# Patient Record
Sex: FEMALE | Race: WHITE | Employment: FULL TIME | URBAN - METROPOLITAN AREA
[De-identification: names, ages, dates, MRNs, and addresses within clinical notes are randomized per-mention and may not be internally consistent; named-entity substitution may affect disease eponyms.]

---

## 2017-01-12 ENCOUNTER — OFFICE VISIT (OUTPATIENT)
Dept: FAMILY MEDICINE CLINIC | Age: 52
End: 2017-01-12

## 2017-01-12 VITALS
DIASTOLIC BLOOD PRESSURE: 80 MMHG | BODY MASS INDEX: 22.97 KG/M2 | HEART RATE: 56 BPM | WEIGHT: 124.8 LBS | SYSTOLIC BLOOD PRESSURE: 118 MMHG | RESPIRATION RATE: 12 BRPM | TEMPERATURE: 97.7 F | HEIGHT: 62 IN

## 2017-01-12 DIAGNOSIS — D50.9 IRON DEFICIENCY ANEMIA, UNSPECIFIED IRON DEFICIENCY ANEMIA TYPE: ICD-10-CM

## 2017-01-12 DIAGNOSIS — R42 DIZZINESS: ICD-10-CM

## 2017-01-12 DIAGNOSIS — F41.9 ANXIETY: Primary | ICD-10-CM

## 2017-01-12 LAB
A/G RATIO: 1.5 (ref 1.1–2.2)
ALBUMIN SERPL-MCNC: 4.3 G/DL (ref 3.4–5)
ALP BLD-CCNC: 41 U/L (ref 40–129)
ALT SERPL-CCNC: 17 U/L (ref 10–40)
ANION GAP SERPL CALCULATED.3IONS-SCNC: 15 MMOL/L (ref 3–16)
AST SERPL-CCNC: 16 U/L (ref 15–37)
BASOPHILS ABSOLUTE: 0 K/UL (ref 0–0.2)
BASOPHILS RELATIVE PERCENT: 0.8 %
BILIRUB SERPL-MCNC: 0.4 MG/DL (ref 0–1)
BUN BLDV-MCNC: 17 MG/DL (ref 7–20)
CALCIUM SERPL-MCNC: 9.3 MG/DL (ref 8.3–10.6)
CHLORIDE BLD-SCNC: 101 MMOL/L (ref 99–110)
CO2: 24 MMOL/L (ref 21–32)
CREAT SERPL-MCNC: 0.7 MG/DL (ref 0.6–1.1)
EOSINOPHILS ABSOLUTE: 0 K/UL (ref 0–0.6)
EOSINOPHILS RELATIVE PERCENT: 0.7 %
GFR AFRICAN AMERICAN: >60
GFR NON-AFRICAN AMERICAN: >60
GLOBULIN: 2.9 G/DL
GLUCOSE BLD-MCNC: 89 MG/DL (ref 70–99)
HCT VFR BLD CALC: 36.3 % (ref 36–48)
HEMOGLOBIN: 12.1 G/DL (ref 12–16)
LYMPHOCYTES ABSOLUTE: 1.2 K/UL (ref 1–5.1)
LYMPHOCYTES RELATIVE PERCENT: 21.9 %
MCH RBC QN AUTO: 30.8 PG (ref 26–34)
MCHC RBC AUTO-ENTMCNC: 33.4 G/DL (ref 31–36)
MCV RBC AUTO: 92.3 FL (ref 80–100)
MONOCYTES ABSOLUTE: 0.4 K/UL (ref 0–1.3)
MONOCYTES RELATIVE PERCENT: 6.3 %
NEUTROPHILS ABSOLUTE: 3.9 K/UL (ref 1.7–7.7)
NEUTROPHILS RELATIVE PERCENT: 70.3 %
PDW BLD-RTO: 12.9 % (ref 12.4–15.4)
PLATELET # BLD: 255 K/UL (ref 135–450)
PMV BLD AUTO: 9.2 FL (ref 5–10.5)
POTASSIUM SERPL-SCNC: 4.2 MMOL/L (ref 3.5–5.1)
RBC # BLD: 3.93 M/UL (ref 4–5.2)
SODIUM BLD-SCNC: 140 MMOL/L (ref 136–145)
TOTAL PROTEIN: 7.2 G/DL (ref 6.4–8.2)
WBC # BLD: 5.6 K/UL (ref 4–11)

## 2017-01-12 PROCEDURE — 99214 OFFICE O/P EST MOD 30 MIN: CPT | Performed by: FAMILY MEDICINE

## 2017-01-12 PROCEDURE — 93000 ELECTROCARDIOGRAM COMPLETE: CPT | Performed by: FAMILY MEDICINE

## 2017-01-12 RX ORDER — ALPRAZOLAM 0.25 MG/1
0.25 TABLET ORAL DAILY PRN
Qty: 30 TABLET | Refills: 0 | Status: SHIPPED | OUTPATIENT
Start: 2017-01-12 | End: 2018-06-29 | Stop reason: CLARIF

## 2017-01-12 RX ORDER — ATENOLOL 25 MG/1
12.5 TABLET ORAL
COMMUNITY
Start: 2015-08-04 | End: 2017-01-12 | Stop reason: ALTCHOICE

## 2017-01-12 ASSESSMENT — ENCOUNTER SYMPTOMS
RESPIRATORY NEGATIVE: 1
EYES NEGATIVE: 1
GASTROINTESTINAL NEGATIVE: 1
ROS SKIN COMMENTS: HAIR LOSS

## 2017-01-13 LAB — FERRITIN: 35.5 NG/ML (ref 15–150)

## 2017-01-23 ENCOUNTER — OFFICE VISIT (OUTPATIENT)
Dept: GYNECOLOGY | Age: 52
End: 2017-01-23

## 2017-01-23 VITALS
BODY MASS INDEX: 23.41 KG/M2 | RESPIRATION RATE: 17 BRPM | HEART RATE: 86 BPM | HEIGHT: 61 IN | TEMPERATURE: 98 F | DIASTOLIC BLOOD PRESSURE: 67 MMHG | WEIGHT: 124 LBS | SYSTOLIC BLOOD PRESSURE: 103 MMHG

## 2017-01-23 DIAGNOSIS — N92.0 MENORRHAGIA WITH REGULAR CYCLE: ICD-10-CM

## 2017-01-23 DIAGNOSIS — Z01.419 WELL WOMAN EXAM WITH ROUTINE GYNECOLOGICAL EXAM: Primary | ICD-10-CM

## 2017-01-23 DIAGNOSIS — N81.4 UTERINE PROLAPSE: ICD-10-CM

## 2017-01-23 PROCEDURE — 99386 PREV VISIT NEW AGE 40-64: CPT | Performed by: OBSTETRICS & GYNECOLOGY

## 2017-01-25 LAB
HPV COMMENT: NORMAL
HPV TYPE 16: NOT DETECTED
HPV TYPE 18: NOT DETECTED
HPVOH (OTHER TYPES): NOT DETECTED

## 2017-01-28 ASSESSMENT — ENCOUNTER SYMPTOMS
RESPIRATORY NEGATIVE: 1
EYES NEGATIVE: 1
GASTROINTESTINAL NEGATIVE: 1

## 2017-04-25 ENCOUNTER — OFFICE VISIT (OUTPATIENT)
Dept: FAMILY MEDICINE CLINIC | Age: 52
End: 2017-04-25

## 2017-04-25 VITALS
OXYGEN SATURATION: 99 % | DIASTOLIC BLOOD PRESSURE: 76 MMHG | SYSTOLIC BLOOD PRESSURE: 118 MMHG | HEIGHT: 62 IN | BODY MASS INDEX: 22.56 KG/M2 | TEMPERATURE: 98.3 F | WEIGHT: 122.6 LBS | HEART RATE: 86 BPM

## 2017-04-25 DIAGNOSIS — R00.2 INTERMITTENT PALPITATIONS: Primary | ICD-10-CM

## 2017-04-25 DIAGNOSIS — F41.8 SITUATIONAL ANXIETY: ICD-10-CM

## 2017-04-25 PROCEDURE — 99213 OFFICE O/P EST LOW 20 MIN: CPT | Performed by: FAMILY MEDICINE

## 2017-04-25 PROCEDURE — 93000 ELECTROCARDIOGRAM COMPLETE: CPT | Performed by: FAMILY MEDICINE

## 2017-04-29 PROBLEM — F41.8 SITUATIONAL ANXIETY: Status: ACTIVE | Noted: 2017-04-29

## 2017-04-29 ASSESSMENT — ENCOUNTER SYMPTOMS: RESPIRATORY NEGATIVE: 1

## 2017-06-22 ENCOUNTER — HOSPITAL ENCOUNTER (OUTPATIENT)
Dept: NON INVASIVE DIAGNOSTICS | Age: 52
Discharge: HOME | End: 2017-06-22
Attending: INTERNAL MEDICINE | Admitting: INTERNAL MEDICINE

## 2017-06-29 ENCOUNTER — TELEPHONE (OUTPATIENT)
Dept: FAMILY MEDICINE CLINIC | Age: 52
End: 2017-06-29

## 2017-07-03 ENCOUNTER — HOSPITAL ENCOUNTER (OUTPATIENT)
Dept: NON INVASIVE DIAGNOSTICS | Age: 52
Discharge: HOME | End: 2017-07-03
Attending: INTERNAL MEDICINE | Admitting: INTERNAL MEDICINE

## 2017-07-03 DIAGNOSIS — R94.31 ABNORMAL ELECTROCARDIOGRAM: ICD-10-CM

## 2017-07-03 LAB
LV EF: 58 %
LVEF MODALITY: NORMAL

## 2018-03-08 ENCOUNTER — OFFICE VISIT (OUTPATIENT)
Dept: URGENT CARE | Age: 53
End: 2018-03-08

## 2018-03-08 VITALS
OXYGEN SATURATION: 100 % | HEIGHT: 62 IN | TEMPERATURE: 96.3 F | WEIGHT: 125 LBS | HEART RATE: 70 BPM | BODY MASS INDEX: 23 KG/M2 | DIASTOLIC BLOOD PRESSURE: 83 MMHG | SYSTOLIC BLOOD PRESSURE: 119 MMHG

## 2018-03-08 DIAGNOSIS — R09.89 CHEST CONGESTION: Primary | ICD-10-CM

## 2018-03-08 DIAGNOSIS — R05.9 COUGH: ICD-10-CM

## 2018-03-08 DIAGNOSIS — J06.9 ACUTE URI: ICD-10-CM

## 2018-03-08 PROCEDURE — 99203 OFFICE O/P NEW LOW 30 MIN: CPT | Performed by: EMERGENCY MEDICINE

## 2018-03-08 RX ORDER — RANOLAZINE 500 MG/1
500 TABLET, EXTENDED RELEASE ORAL 2 TIMES DAILY
COMMUNITY
End: 2018-04-17 | Stop reason: ALTCHOICE

## 2018-03-08 RX ORDER — BENZONATATE 200 MG/1
200 CAPSULE ORAL 3 TIMES DAILY PRN
Qty: 15 CAPSULE | Refills: 0 | Status: SHIPPED | OUTPATIENT
Start: 2018-03-08 | End: 2018-04-17 | Stop reason: ALTCHOICE

## 2018-03-08 RX ORDER — GUAIFENESIN DEXTROMETHORPHAN HYDROBROMIDE ORAL SOLUTION 10; 100 MG/5ML; MG/5ML
10 SOLUTION ORAL EVERY 4 HOURS PRN
Qty: 237 ML | Refills: 0 | Status: SHIPPED | OUTPATIENT
Start: 2018-03-08 | End: 2018-04-17 | Stop reason: ALTCHOICE

## 2018-03-08 ASSESSMENT — ENCOUNTER SYMPTOMS
EYE PAIN: 0
SINUS PRESSURE: 1
SORE THROAT: 1
SINUS PAIN: 0
RHINORRHEA: 0
EYE DISCHARGE: 0
TROUBLE SWALLOWING: 0
COUGH: 1
DIARRHEA: 0
ABDOMINAL PAIN: 0
SHORTNESS OF BREATH: 0
VOMITING: 0
WHEEZING: 0
NAUSEA: 0

## 2018-03-08 NOTE — PATIENT INSTRUCTIONS
RX: TESSALON PERLES, TUSSIN DM MAX    Follow-up with your primary care physician in 1 week if not improving. If you do not have a primary care physician, call [phone] for a referral or visit [email or web address]      Patient Education        Upper Respiratory Infection (Cold): Care Instructions  Your Care Instructions    An upper respiratory infection, or URI, is an infection of the nose, sinuses, or throat. URIs are spread by coughs, sneezes, and direct contact. The common cold is the most frequent kind of URI. The flu and sinus infections are other kinds of URIs. Almost all URIs are caused by viruses. Antibiotics won't cure them. But you can treat most infections with home care. This may include drinking lots of fluids and taking over-the-counter pain medicine. You will probably feel better in 4 to 10 days. The doctor has checked you carefully, but problems can develop later. If you notice any problems or new symptoms, get medical treatment right away. Follow-up care is a key part of your treatment and safety. Be sure to make and go to all appointments, and call your doctor if you are having problems. It's also a good idea to know your test results and keep a list of the medicines you take. How can you care for yourself at home? · To prevent dehydration, drink plenty of fluids, enough so that your urine is light yellow or clear like water. Choose water and other caffeine-free clear liquids until you feel better. If you have kidney, heart, or liver disease and have to limit fluids, talk with your doctor before you increase the amount of fluids you drink. · Take an over-the-counter pain medicine, such as acetaminophen (Tylenol), ibuprofen (Advil, Motrin), or naproxen (Aleve). Read and follow all instructions on the label. · Before you use cough and cold medicines, check the label. These medicines may not be safe for young children or for people with certain health problems.   · Be careful when taking over-the-counter cold or flu medicines and Tylenol at the same time. Many of these medicines have acetaminophen, which is Tylenol. Read the labels to make sure that you are not taking more than the recommended dose. Too much acetaminophen (Tylenol) can be harmful. · Get plenty of rest.  · Do not smoke or allow others to smoke around you. If you need help quitting, talk to your doctor about stop-smoking programs and medicines. These can increase your chances of quitting for good. When should you call for help? Call 911 anytime you think you may need emergency care. For example, call if:  ? · You have severe trouble breathing. ?Call your doctor now or seek immediate medical care if:  ? · You seem to be getting much sicker. ? · You have new or worse trouble breathing. ? · You have a new or higher fever. ? · You have a new rash. ? Watch closely for changes in your health, and be sure to contact your doctor if:  ? · You have a new symptom, such as a sore throat, an earache, or sinus pain. ? · You cough more deeply or more often, especially if you notice more mucus or a change in the color of your mucus. ? · You do not get better as expected. Where can you learn more? Go to https://[employer] Beyond.Ecofoot. org and sign in to your [employer] account. Enter J736 in the [employer] box to learn more about \"Upper Respiratory Infection (Cold): Care Instructions. \"     If you do not have an account, please click on the \"Sign Up Now\" link. Current as of: May 12, 2017  Content Version: 11.5  © 8534-6054 Healthwise, Incorporated. Care instructions adapted under license by [facility] [place] ([facility]). If you have questions about a medical condition or this instruction, always ask your healthcare professional. [place] [address] any warranty or liability for your use of this information.

## 2018-04-12 ENCOUNTER — OFFICE VISIT (OUTPATIENT)
Dept: FAMILY MEDICINE CLINIC | Age: 53
End: 2018-04-12

## 2018-04-12 VITALS
TEMPERATURE: 97.9 F | SYSTOLIC BLOOD PRESSURE: 114 MMHG | RESPIRATION RATE: 16 BRPM | BODY MASS INDEX: 24.33 KG/M2 | HEART RATE: 68 BPM | OXYGEN SATURATION: 99 % | WEIGHT: 132.2 LBS | DIASTOLIC BLOOD PRESSURE: 70 MMHG | HEIGHT: 62 IN

## 2018-04-12 DIAGNOSIS — R10.11 ABDOMINAL PAIN, RUQ: Primary | ICD-10-CM

## 2018-04-12 DIAGNOSIS — R10.11 ABDOMINAL PAIN, RUQ: ICD-10-CM

## 2018-04-12 LAB
A/G RATIO: 1.6 (ref 1.1–2.2)
ALBUMIN SERPL-MCNC: 4.5 G/DL (ref 3.4–5)
ALP BLD-CCNC: 45 U/L (ref 40–129)
ALT SERPL-CCNC: 34 U/L (ref 10–40)
ANION GAP SERPL CALCULATED.3IONS-SCNC: 15 MMOL/L (ref 3–16)
AST SERPL-CCNC: 23 U/L (ref 15–37)
BASOPHILS ABSOLUTE: 0 K/UL (ref 0–0.2)
BASOPHILS RELATIVE PERCENT: 0.6 %
BILIRUB SERPL-MCNC: 0.3 MG/DL (ref 0–1)
BUN BLDV-MCNC: 14 MG/DL (ref 7–20)
CALCIUM SERPL-MCNC: 8.8 MG/DL (ref 8.3–10.6)
CHLORIDE BLD-SCNC: 100 MMOL/L (ref 99–110)
CO2: 24 MMOL/L (ref 21–32)
CREAT SERPL-MCNC: 0.8 MG/DL (ref 0.6–1.1)
EOSINOPHILS ABSOLUTE: 0.1 K/UL (ref 0–0.6)
EOSINOPHILS RELATIVE PERCENT: 1.3 %
GFR AFRICAN AMERICAN: >60
GFR NON-AFRICAN AMERICAN: >60
GLOBULIN: 2.8 G/DL
GLUCOSE BLD-MCNC: 84 MG/DL (ref 70–99)
HCT VFR BLD CALC: 36.9 % (ref 36–48)
HEMOGLOBIN: 12.4 G/DL (ref 12–16)
LIPASE: 37 U/L (ref 13–60)
LYMPHOCYTES ABSOLUTE: 1.5 K/UL (ref 1–5.1)
LYMPHOCYTES RELATIVE PERCENT: 21.2 %
MCH RBC QN AUTO: 31.7 PG (ref 26–34)
MCHC RBC AUTO-ENTMCNC: 33.7 G/DL (ref 31–36)
MCV RBC AUTO: 94.3 FL (ref 80–100)
MONOCYTES ABSOLUTE: 0.5 K/UL (ref 0–1.3)
MONOCYTES RELATIVE PERCENT: 6.4 %
NEUTROPHILS ABSOLUTE: 5.1 K/UL (ref 1.7–7.7)
NEUTROPHILS RELATIVE PERCENT: 70.5 %
PDW BLD-RTO: 13.5 % (ref 12.4–15.4)
PLATELET # BLD: 281 K/UL (ref 135–450)
PMV BLD AUTO: 8.9 FL (ref 5–10.5)
POTASSIUM SERPL-SCNC: 4.5 MMOL/L (ref 3.5–5.1)
RBC # BLD: 3.91 M/UL (ref 4–5.2)
SODIUM BLD-SCNC: 139 MMOL/L (ref 136–145)
TOTAL PROTEIN: 7.3 G/DL (ref 6.4–8.2)
WBC # BLD: 7.2 K/UL (ref 4–11)

## 2018-04-12 PROCEDURE — 99213 OFFICE O/P EST LOW 20 MIN: CPT | Performed by: FAMILY MEDICINE

## 2018-04-17 ASSESSMENT — ENCOUNTER SYMPTOMS
RESPIRATORY NEGATIVE: 1
ABDOMINAL PAIN: 1

## 2018-04-20 ENCOUNTER — HOSPITAL ENCOUNTER (OUTPATIENT)
Dept: ULTRASOUND IMAGING | Age: 53
Discharge: HOME | End: 2018-04-20
Attending: FAMILY MEDICINE | Admitting: FAMILY MEDICINE

## 2018-04-20 DIAGNOSIS — R10.11 RIGHT UPPER QUADRANT PAIN: ICD-10-CM

## 2018-04-20 DIAGNOSIS — R10.11 ABDOMINAL PAIN, RUQ: ICD-10-CM

## 2018-05-29 ENCOUNTER — EMPLOYEE WELLNESS (OUTPATIENT)
Dept: OTHER | Age: 53
End: 2018-05-29

## 2018-05-29 LAB
CHOLESTEROL, TOTAL: 189 MG/DL (ref 0–199)
GLUCOSE BLD-MCNC: 91 MG/DL (ref 70–99)
HDLC SERPL-MCNC: 71 MG/DL (ref 40–60)
LDL CHOLESTEROL CALCULATED: 104 MG/DL
TRIGL SERPL-MCNC: 71 MG/DL (ref 0–150)

## 2018-06-11 VITALS — BODY MASS INDEX: 24.33 KG/M2 | WEIGHT: 133 LBS

## 2018-06-29 ENCOUNTER — OFFICE VISIT (OUTPATIENT)
Dept: URGENT CARE | Age: 53
End: 2018-06-29

## 2018-06-29 VITALS
SYSTOLIC BLOOD PRESSURE: 146 MMHG | WEIGHT: 125 LBS | RESPIRATION RATE: 16 BRPM | DIASTOLIC BLOOD PRESSURE: 89 MMHG | HEIGHT: 62 IN | HEART RATE: 78 BPM | TEMPERATURE: 98.6 F | BODY MASS INDEX: 23 KG/M2

## 2018-06-29 DIAGNOSIS — R21 RASH: Primary | ICD-10-CM

## 2018-06-29 PROCEDURE — 99213 OFFICE O/P EST LOW 20 MIN: CPT | Performed by: EMERGENCY MEDICINE

## 2018-06-29 RX ORDER — RANOLAZINE 1000 MG/1
500 TABLET, EXTENDED RELEASE ORAL DAILY
COMMUNITY
End: 2018-09-19 | Stop reason: ALTCHOICE

## 2018-06-30 ASSESSMENT — ENCOUNTER SYMPTOMS
FACIAL SWELLING: 0
RHINORRHEA: 0

## 2018-07-17 ENCOUNTER — OFFICE VISIT (OUTPATIENT)
Dept: URGENT CARE | Age: 53
End: 2018-07-17

## 2018-07-17 VITALS
BODY MASS INDEX: 24.48 KG/M2 | WEIGHT: 133 LBS | DIASTOLIC BLOOD PRESSURE: 82 MMHG | RESPIRATION RATE: 12 BRPM | HEIGHT: 62 IN | HEART RATE: 72 BPM | SYSTOLIC BLOOD PRESSURE: 132 MMHG | TEMPERATURE: 97.1 F

## 2018-07-17 DIAGNOSIS — L08.9 INFECTION OF SKIN, LOCAL: Primary | ICD-10-CM

## 2018-07-17 PROCEDURE — 99213 OFFICE O/P EST LOW 20 MIN: CPT | Performed by: NURSE PRACTITIONER

## 2018-07-17 RX ORDER — CLINDAMYCIN HYDROCHLORIDE 300 MG/1
300 CAPSULE ORAL 3 TIMES DAILY
Qty: 21 CAPSULE | Refills: 0 | Status: SHIPPED | OUTPATIENT
Start: 2018-07-17 | End: 2018-07-24

## 2018-07-17 NOTE — PROGRESS NOTES
Patient: Florence García is a 46 y.o. female who presents today with the following Chief Complaint(s):   Chief Complaint   Patient presents with    Rash     was here for a spot on bottom of nasal area and hasn't gotten any better. HPI  Rash: Patient complains of rash involving the left side of nose. Rash started 4 weeks ago. Appearance of rash at onset: Color of lesion(s): red. Rash has come and gone. Initial distribution: under left nostril. Discomfort associated with rash: is painful. Associated symptoms: none. Denies: fever, congestion. Patient has had previous evaluation of rash. Patient has had previous treatment. Response to treatment: with bactroban ointment was little to no improvement. Patient has not had contacts with similar rash. Patient has not identified precipitant. Patient has not had new exposures (soaps, lotions, laundry detergents, foods, medications, plants, insects or animals.)    Patient's past medical history, surgical history, family history, medications, and allergies were all reviewed and updated as appropriate today. Review of Systems   Skin: Positive for rash. Left nostil   All other systems reviewed and are negative. Patient Active Problem List   Diagnosis    GERD (gastroesophageal reflux disease)    Hair loss    Situational anxiety    STD (sexually transmitted disease)       Vitals:  Vitals:    07/17/18 1429   BP: 132/82   Site: Right Arm   Position: Sitting   Cuff Size: Medium Adult   Pulse: 72   Resp: 12   Temp: 97.1 °F (36.2 °C)   TempSrc: Oral   Weight: 133 lb (60.3 kg)   Height: 5' 2\" (1.575 m)       Physical Exam   Constitutional: She is oriented to person, place, and time. She appears well-developed and well-nourished. No distress. HENT:   Head: Normocephalic.    Right Ear: Hearing, tympanic membrane and external ear normal.   Left Ear: Hearing, tympanic membrane and external ear normal.   Nose: Nose normal.   Mouth/Throat: Uvula is midline, oropharynx is clear and moist and mucous membranes are normal.   Eyes: Conjunctivae and EOM are normal. Pupils are equal, round, and reactive to light. Neck: Normal range of motion. Cardiovascular: Normal rate, regular rhythm, normal heart sounds and intact distal pulses. No murmur heard. Pulmonary/Chest: Effort normal and breath sounds normal. She has no wheezes. Lymphadenopathy:     She has no cervical adenopathy. Neurological: She is alert and oriented to person, place, and time. She has normal reflexes. She exhibits normal muscle tone. Coordination normal.   Skin:        Psychiatric: Her behavior is normal.   Vitals reviewed. Assessment/Plan:     1. Infection of skin, local  Patient concerned that infection is reoccurring since she has not been consistent with topical treatment. Reported that at last [facility] visit the provider discussed oral antibiotic as a last resort option. Recommended to start antibiotics since infection is not clearing at this point. Also, continue topical treatment as well.   Will follow-up with PCP if no improvement.    -clindamycin (CLEOCIN) 300mg cap, PO, 3 times daily for 7 days  -mupirocin (bactroban) 2% ointment, topically, 3 times daily for 7 days      [address], APRN - NP

## 2018-07-20 ENCOUNTER — OFFICE VISIT (OUTPATIENT)
Dept: URGENT CARE | Age: 53
End: 2018-07-20

## 2018-07-20 VITALS
TEMPERATURE: 96.6 F | HEART RATE: 73 BPM | RESPIRATION RATE: 16 BRPM | WEIGHT: 125 LBS | DIASTOLIC BLOOD PRESSURE: 86 MMHG | HEIGHT: 62 IN | BODY MASS INDEX: 23 KG/M2 | SYSTOLIC BLOOD PRESSURE: 116 MMHG

## 2018-07-20 DIAGNOSIS — L30.9 DERMATITIS: Primary | ICD-10-CM

## 2018-07-20 PROCEDURE — 99214 OFFICE O/P EST MOD 30 MIN: CPT | Performed by: EMERGENCY MEDICINE

## 2018-07-20 ASSESSMENT — ENCOUNTER SYMPTOMS: SORE THROAT: 0

## 2018-07-20 NOTE — PATIENT INSTRUCTIONS
your doctor now or seek immediate medical care if:    · You have symptoms of infection, such as:  ¨ Increased pain, swelling, warmth, or redness. ¨ Red streaks leading from the area. ¨ Pus draining from the area. ¨ A fever.     · You have joint pain along with the rash.    Watch closely for changes in your health, and be sure to contact your doctor if:    · Your rash is changing or getting worse.     · You are not getting better as expected. Where can you learn more? Go to https://DoubleCheck Solutions.Data Expedition. org and sign in to your [employer] account. Enter [phone] in the [employer] box to learn more about \"Dermatitis: Care Instructions. \"     If you do not have an account, please click on the \"Sign Up Now\" link. Current as of: October 5, 2017  Content Version: 11.6  © 4612-9194 [employer], Incorporated. Care instructions adapted under license by [facility] ([facility]). If you have questions about a medical condition or this instruction, always ask your healthcare professional. [address] any warranty or liability for your use of this information.

## 2018-09-05 ENCOUNTER — TELEPHONE (OUTPATIENT)
Dept: FAMILY MEDICINE CLINIC | Age: 53
End: 2018-09-05

## 2018-09-05 NOTE — TELEPHONE ENCOUNTER
Patient calling requesting colonoscopy order be faxed to [facility] scheduling. Advised patient can fax referral to Dr. Nisreen barcenas and gave patient office phone number of [phone]. Patient will contact office to schedule procedure.   Placed referral in [facility] and will fax to specialist.

## 2018-10-05 ENCOUNTER — HOSPITAL ENCOUNTER (OUTPATIENT)
Age: 53
Setting detail: OUTPATIENT SURGERY
Discharge: HOME | End: 2018-10-05
Attending: INTERNAL MEDICINE | Admitting: INTERNAL MEDICINE
Payer: COMMERCIAL

## 2018-10-05 ENCOUNTER — ANESTHESIA (OUTPATIENT)
Dept: ENDOSCOPY | Age: 53
End: 2018-10-05
Payer: COMMERCIAL

## 2018-10-05 ENCOUNTER — ANESTHESIA EVENT (OUTPATIENT)
Dept: ENDOSCOPY | Age: 53
End: 2018-10-05
Payer: COMMERCIAL

## 2018-10-05 VITALS
RESPIRATION RATE: 16 BRPM | OXYGEN SATURATION: 100 % | TEMPERATURE: 97.6 F | DIASTOLIC BLOOD PRESSURE: 73 MMHG | SYSTOLIC BLOOD PRESSURE: 125 MMHG | BODY MASS INDEX: 23 KG/M2 | HEIGHT: 62 IN | WEIGHT: 125 LBS | HEART RATE: 68 BPM

## 2018-10-05 VITALS — DIASTOLIC BLOOD PRESSURE: 71 MMHG | OXYGEN SATURATION: 98 % | SYSTOLIC BLOOD PRESSURE: 114 MMHG

## 2018-10-05 LAB — HCG(URINE) PREGNANCY TEST: NEGATIVE

## 2018-10-05 PROCEDURE — 3700000000 HC ANESTHESIA ATTENDED CARE: Performed by: INTERNAL MEDICINE

## 2018-10-05 PROCEDURE — 2580000003 HC RX 258: Performed by: ANESTHESIOLOGY

## 2018-10-05 PROCEDURE — 84703 CHORIONIC GONADOTROPIN ASSAY: CPT

## 2018-10-05 PROCEDURE — 7100000010 HC PHASE II RECOVERY - FIRST 15 MIN: Performed by: INTERNAL MEDICINE

## 2018-10-05 PROCEDURE — 3609010600 HC COLONOSCOPY POLYPECTOMY SNARE/COLD BIOPSY: Performed by: INTERNAL MEDICINE

## 2018-10-05 PROCEDURE — 2709999900 HC NON-CHARGEABLE SUPPLY: Performed by: INTERNAL MEDICINE

## 2018-10-05 PROCEDURE — 7100000011 HC PHASE II RECOVERY - ADDTL 15 MIN: Performed by: INTERNAL MEDICINE

## 2018-10-05 PROCEDURE — 2580000003 HC RX 258: Performed by: NURSE ANESTHETIST, CERTIFIED REGISTERED

## 2018-10-05 PROCEDURE — 3700000001 HC ADD 15 MINUTES (ANESTHESIA): Performed by: INTERNAL MEDICINE

## 2018-10-05 PROCEDURE — 2500000003 HC RX 250 WO HCPCS: Performed by: NURSE ANESTHETIST, CERTIFIED REGISTERED

## 2018-10-05 RX ORDER — SODIUM CHLORIDE 9 MG/ML
INJECTION, SOLUTION INTRAVENOUS CONTINUOUS
Status: DISCONTINUED | OUTPATIENT
Start: 2018-10-05 | End: 2018-10-10 | Stop reason: HOSPADM

## 2018-10-05 RX ORDER — PROPOFOL 10 MG/ML
INJECTION, EMULSION INTRAVENOUS CONTINUOUS PRN
Status: DISCONTINUED | OUTPATIENT
Start: 2018-10-05 | End: 2018-10-05 | Stop reason: SDUPTHER

## 2018-10-05 RX ORDER — SODIUM CHLORIDE 9 MG/ML
INJECTION, SOLUTION INTRAVENOUS CONTINUOUS PRN
Status: DISCONTINUED | OUTPATIENT
Start: 2018-10-05 | End: 2018-10-05 | Stop reason: SDUPTHER

## 2018-10-05 RX ORDER — LIDOCAINE HYDROCHLORIDE 20 MG/ML
INJECTION, SOLUTION INFILTRATION; PERINEURAL PRN
Status: DISCONTINUED | OUTPATIENT
Start: 2018-10-05 | End: 2018-10-05 | Stop reason: SDUPTHER

## 2018-10-05 RX ADMIN — SODIUM CHLORIDE: 9 INJECTION, SOLUTION INTRAVENOUS at 11:03

## 2018-10-05 RX ADMIN — PROPOFOL 100 MCG/KG/MIN: 10 INJECTION, EMULSION INTRAVENOUS at 11:27

## 2018-10-05 RX ADMIN — LIDOCAINE HYDROCHLORIDE 100 MG: 20 INJECTION, SOLUTION INFILTRATION; PERINEURAL at 11:27

## 2018-10-05 RX ADMIN — SODIUM CHLORIDE: 9 INJECTION, SOLUTION INTRAVENOUS at 11:22

## 2018-10-05 ASSESSMENT — PAIN - FUNCTIONAL ASSESSMENT: PAIN_FUNCTIONAL_ASSESSMENT: 0-10

## 2018-10-05 ASSESSMENT — PAIN SCALES - GENERAL: PAINLEVEL_OUTOF10: 0

## 2018-10-05 NOTE — ANESTHESIA PRE PROCEDURE
BP Readings from Last 3 Encounters:   10/05/18 134/83   07/20/18 116/86   07/17/18 132/82       NPO Status: Time of last liquid consumption: 0530                        Time of last solid consumption: 0930                        Date of last liquid consumption: 10/05/18                        Date of last solid food consumption: 10/04/18    BMI:   Wt Readings from Last 3 Encounters:   10/05/18 125 lb (56.7 kg)   09/19/18 125 lb (56.7 kg)   07/20/18 125 lb (56.7 kg)     Body mass index is 22.86 kg/m². CBC:   Lab Results   Component Value Date    WBC 7.2 04/12/2018    RBC 3.91 04/12/2018    HGB 12.4 04/12/2018    HCT 36.9 04/12/2018    MCV 94.3 04/12/2018    RDW 13.5 04/12/2018     04/12/2018       CMP:   Lab Results   Component Value Date     04/12/2018    K 4.5 04/12/2018     04/12/2018    CO2 24 04/12/2018    BUN 14 04/12/2018    CREATININE 0.8 04/12/2018    GFRAA >60 04/12/2018    AGRATIO 1.6 04/12/2018    LABGLOM >60 04/12/2018    GLUCOSE 91 05/29/2018    PROT 7.3 04/12/2018    CALCIUM 8.8 04/12/2018    BILITOT 0.3 04/12/2018    ALKPHOS 45 04/12/2018    AST 23 04/12/2018    ALT 34 04/12/2018       POC Tests: No results for input(s): POCGLU, POCNA, POCK, POCCL, POCBUN, POCHEMO, POCHCT in the last 72 hours.     Coags: No results found for: PROTIME, INR, APTT    HCG (If Applicable):   Lab Results   Component Value Date    PREGTESTUR Negative 10/05/2018        ABGs: No results found for: PHART, PO2ART, HSA3BPF, UOU0FYH, BEART, Z0RFGGKK     Type & Screen (If Applicable):  No results found for: LABABO, [address]    Anesthesia Evaluation  Patient summary reviewed and Nursing notes reviewed no history of anesthetic complications:   Airway: Mallampati: II        Dental: normal exam         Pulmonary:Negative Pulmonary ROS and normal exam                               Cardiovascular:  Exercise tolerance: good (>4 METS),   (+) angina: no interval change,         Rhythm: regular

## 2018-10-08 NOTE — ANESTHESIA POSTPROCEDURE EVALUATION
Department of Anesthesiology  Postprocedure Note    Patient: Dorys Moore  MRN: [id number]  YOB: 1965  Date of evaluation: 10/8/2018  Time:  11:38 AM     Procedure Summary     Date:  10/05/18 Room / Location:  [facility] ENDO 02 / [facility] ENDOSCOPY    Anesthesia Start:  1126 Anesthesia Stop:  1152    Procedure:  COLONOSCOPY POLYPECTOMY SNARE/COLD BIOPSY (N/A ) Diagnosis:  (COLON CANCER SCREENING Z12.11)    Surgeon:  Roxy Kuo MD Responsible Provider:  Chelsey Carr MD    Anesthesia Type:  MAC ASA Status:  2          Anesthesia Type: MAC    Franklin Phase I: Franklin Score: 10    Franklin Phase II: Franklin Score: 10    Last vitals: Reviewed and per EMR flowsheets.        Anesthesia Post Evaluation    Patient location during evaluation: PACU  Level of consciousness: awake  Airway patency: patent  Complications: no  Cardiovascular status: blood pressure returned to baseline  Respiratory status: acceptable

## 2018-10-11 ENCOUNTER — OFFICE VISIT (OUTPATIENT)
Dept: FAMILY MEDICINE CLINIC | Age: 53
End: 2018-10-11
Payer: COMMERCIAL

## 2018-10-11 VITALS
HEART RATE: 80 BPM | RESPIRATION RATE: 17 BRPM | SYSTOLIC BLOOD PRESSURE: 120 MMHG | HEIGHT: 62 IN | WEIGHT: 129 LBS | DIASTOLIC BLOOD PRESSURE: 80 MMHG | OXYGEN SATURATION: 99 % | TEMPERATURE: 98 F | BODY MASS INDEX: 23.74 KG/M2

## 2018-10-11 DIAGNOSIS — R21 FACIAL RASH: ICD-10-CM

## 2018-10-11 DIAGNOSIS — R10.11 INTERMITTENT RIGHT UPPER QUADRANT ABDOMINAL PAIN: Primary | ICD-10-CM

## 2018-10-11 PROCEDURE — 99214 OFFICE O/P EST MOD 30 MIN: CPT | Performed by: FAMILY MEDICINE

## 2018-10-11 NOTE — PROGRESS NOTES
Sussy Wynn   YOB: 1965    Date of Visit:  10/11/2018        Chief Complaint   Patient presents with    Abdominal Pain    Rash     nose area         HPI:      Patient presents with complaints of intermittent right upper quadrant abdominal pain that has been ongoing for a year. Last episode occurring 3 days ago. Describes it as a stabbing and burning sensation. Episodes come on sporadically and would spontaneously resolve after seconds to minutes. She date the pain as 8 out of 10 at its worst. She reports to being asymptomatic today. She denies any other gastrointestinal symptoms including nausea and diarrhea. She was previously evaluated for a similar concern back in April. Right upper quadrant abdominal ultrasound showed a small amount of biliary sludge at the time. Patient also follows up for evaluation of a rash to her left nasolabial area. She describes the rash as pinkish and sometimes papular. She has had similar rashes in the past and was evaluated at urgent care where she was prescribed mupirocin and oral antibiotics. She reports to the rash improving following the treatment but would return every few months. She denies current cellulitic symptoms.           Patient Active Problem List   Diagnosis    GERD (gastroesophageal reflux disease)    Hair loss    Situational anxiety    STD (sexually transmitted disease)    Chronic intermittent right upper quadrant abdominal pain         Past Surgical History:   Procedure Laterality Date     SECTION          COLONOSCOPY N/A 10/5/2018    COLONOSCOPY POLYPECTOMY SNARE/COLD BIOPSY performed by Luis Miguel Short MD at [address] for Endometrosis    LEEP      PELVIC LAPAROSCOPY      TONSILLECTOMY           Family History   Problem Relation Age of Onset    No Known Problems Mother     Other Father     Coronary Art Dis Father     No Known Problems Sister

## 2018-10-12 ASSESSMENT — ENCOUNTER SYMPTOMS
ABDOMINAL PAIN: 1
RESPIRATORY NEGATIVE: 1

## 2018-12-28 ENCOUNTER — HOSPITAL ENCOUNTER (OUTPATIENT)
Dept: MAMMOGRAPHY | Age: 53
Discharge: HOME | End: 2018-12-28
Payer: COMMERCIAL

## 2018-12-28 DIAGNOSIS — Z12.31 VISIT FOR SCREENING MAMMOGRAM: ICD-10-CM

## 2018-12-28 PROCEDURE — 77063 BREAST TOMOSYNTHESIS BI: CPT

## 2019-01-04 ENCOUNTER — HOSPITAL ENCOUNTER (OUTPATIENT)
Dept: MAMMOGRAPHY | Age: 54
Discharge: HOME | End: 2019-01-04
Payer: COMMERCIAL

## 2019-01-04 ENCOUNTER — HOSPITAL ENCOUNTER (OUTPATIENT)
Dept: ULTRASOUND IMAGING | Age: 54
Discharge: HOME | End: 2019-01-04
Payer: COMMERCIAL

## 2019-01-04 DIAGNOSIS — R92.8 ABNORMAL MAMMOGRAM: ICD-10-CM

## 2019-01-04 DIAGNOSIS — N63.0 BREAST NODULE: ICD-10-CM

## 2019-01-04 DIAGNOSIS — N63.0 BREAST NODULE: Primary | ICD-10-CM

## 2019-01-04 PROCEDURE — G0279 TOMOSYNTHESIS, MAMMO: HCPCS

## 2019-01-04 PROCEDURE — 76642 ULTRASOUND BREAST LIMITED: CPT

## 2019-03-22 ENCOUNTER — NURSE TRIAGE (OUTPATIENT)
Dept: OTHER | Facility: CLINIC | Age: 54
End: 2019-03-22

## 2019-08-01 ENCOUNTER — OFFICE VISIT (OUTPATIENT)
Dept: PRIMARY CARE CLINIC | Age: 54
End: 2019-08-01
Payer: COMMERCIAL

## 2019-08-01 VITALS
OXYGEN SATURATION: 100 % | DIASTOLIC BLOOD PRESSURE: 80 MMHG | HEIGHT: 62 IN | SYSTOLIC BLOOD PRESSURE: 134 MMHG | HEART RATE: 62 BPM | RESPIRATION RATE: 16 BRPM | BODY MASS INDEX: 23.74 KG/M2 | WEIGHT: 129 LBS

## 2019-08-01 DIAGNOSIS — L03.011 CELLULITIS OF RIGHT LITTLE FINGER: Primary | ICD-10-CM

## 2019-08-01 PROCEDURE — 99214 OFFICE O/P EST MOD 30 MIN: CPT | Performed by: EMERGENCY MEDICINE

## 2019-08-01 RX ORDER — AMOXICILLIN AND CLAVULANATE POTASSIUM 875; 125 MG/1; MG/1
1 TABLET, FILM COATED ORAL 2 TIMES DAILY
Qty: 20 TABLET | Refills: 0 | Status: SHIPPED | OUTPATIENT
Start: 2019-08-01 | End: 2019-08-11

## 2019-08-01 ASSESSMENT — ENCOUNTER SYMPTOMS: COLOR CHANGE: 1

## 2019-08-01 NOTE — PATIENT INSTRUCTIONS
RX: AUGMENTIN    Follow-up with your primary care physician or return here within 1 week if not improving. If you do not have a primary care physician, call [phone] for a referral or visit [email or web address]      Patient Education        Cellulitis: Care Instructions  Your Care Instructions    Cellulitis is a skin infection caused by bacteria, most often strep or staph. It often occurs after a break in the skin from a scrape, cut, bite, or puncture, or after a rash. Cellulitis may be treated without doing tests to find out what caused it. But your doctor may do tests, if needed, to look for a specific bacteria, like methicillin-resistant Staphylococcus aureus (MRSA). The doctor has checked you carefully, but problems can develop later. If you notice any problems or new symptoms, get medical treatment right away. Follow-up care is a key part of your treatment and safety. Be sure to make and go to all appointments, and call your doctor if you are having problems. It's also a good idea to know your test results and keep a list of the medicines you take. How can you care for yourself at home? · Take your antibiotics as directed. Do not stop taking them just because you feel better. You need to take the full course of antibiotics. · Prop up the infected area on pillows to reduce pain and swelling. Try to keep the area above the level of your heart as often as you can. · If your doctor told you how to care for your wound, follow your doctor's instructions. If you did not get instructions, follow this general advice:  ? Wash the wound with clean water 2 times a day. Don't use hydrogen peroxide or alcohol, which can slow healing. ? You may cover the wound with a thin layer of petroleum jelly, such as Vaseline, and a nonstick bandage. ? Apply more petroleum jelly and replace the bandage as needed. · Be safe with medicines. Take pain medicines exactly as directed.   ? If the doctor gave you a

## 2020-01-20 ENCOUNTER — NURSE TRIAGE (OUTPATIENT)
Dept: OTHER | Facility: CLINIC | Age: 55
End: 2020-01-20

## 2020-01-21 ENCOUNTER — NURSE TRIAGE (OUTPATIENT)
Dept: OTHER | Facility: CLINIC | Age: 55
End: 2020-01-21

## 2020-01-27 ENCOUNTER — OFFICE VISIT (OUTPATIENT)
Dept: PRIMARY CARE CLINIC | Age: 55
End: 2020-01-27
Payer: COMMERCIAL

## 2020-01-27 VITALS
TEMPERATURE: 96.4 F | SYSTOLIC BLOOD PRESSURE: 139 MMHG | DIASTOLIC BLOOD PRESSURE: 89 MMHG | HEIGHT: 62 IN | RESPIRATION RATE: 16 BRPM | BODY MASS INDEX: 23 KG/M2 | WEIGHT: 125 LBS | HEART RATE: 83 BPM | OXYGEN SATURATION: 100 %

## 2020-01-27 PROCEDURE — 99212 OFFICE O/P EST SF 10 MIN: CPT | Performed by: PHYSICIAN ASSISTANT

## 2020-01-27 RX ORDER — AMOXICILLIN 500 MG/1
500 CAPSULE ORAL 2 TIMES DAILY
Qty: 14 CAPSULE | Refills: 0 | Status: SHIPPED | OUTPATIENT
Start: 2020-01-27 | End: 2020-01-30 | Stop reason: ALTCHOICE

## 2020-01-27 ASSESSMENT — ENCOUNTER SYMPTOMS
ABDOMINAL PAIN: 0
VOICE CHANGE: 0
COUGH: 0
NAUSEA: 0
SINUS PRESSURE: 1
DIARRHEA: 0
TROUBLE SWALLOWING: 0
SORE THROAT: 1
RHINORRHEA: 1
VOMITING: 0
WHEEZING: 0

## 2020-01-27 NOTE — PATIENT INSTRUCTIONS

## 2020-01-29 NOTE — PROGRESS NOTES
Rajesh Gastelum   47 y.o. female   1965    HPI:  Chief Complaint   Patient presents with   [address] to provider, concerned about b/p     This is patient's first visit with me. She is not on any chronic meds; however, she's on Amoxicillin right now, which she has been taking and was started about 2 weeks ago. She is half way through with it. She feels fine now. Denied URI/LRI symptoms. She works at a [employer].    She has history of HTN and was on Atenolol 25 mg, which she was last on in August 2015. She recounted that when she was sick, her BP readings were  or 180's and has had overall elevated BP readings higher than average. She feels fine now. She works out twice a week in the gym particularly working in the stairs. Last pap smear was 1/23/2017 and was negative for intraepithelial lesion or malignancy. She is UTD with health maintenance except for Tdap, which she declined and Shingles vaccine. Allergies   Allergen Reactions    Erythromycin Other (See Comments)     fever    Influenza Vaccines     Sulfamethoxazole-Trimethoprim     Cephalexin Rash    Sulfa Antibiotics Hives and Rash     No current outpatient medications on file prior to visit. No current facility-administered medications on file prior to visit.        Family History   Problem Relation Age of Onset    No Known Problems Mother     Other Father     Coronary Art Dis Father     No Known Problems Sister     No Known Problems Brother     No Known Problems Maternal Aunt     No Known Problems Maternal Uncle     No Known Problems Paternal Aunt     No Known Problems Paternal Uncle     No Known Problems Maternal Grandmother     No Known Problems Maternal Grandfather     No Known Problems Paternal Grandmother     No Known Problems Paternal Grandfather     No Known Problems Other     Rheum Arthritis Neg Hx     Osteoarthritis Neg Hx     Asthma Neg Hx     Breast Movements: Extraocular movements intact. Conjunctiva/sclera: Conjunctivae normal.   Neck:      Musculoskeletal: Normal range of motion. Cardiovascular:      Rate and Rhythm: Normal rate and regular rhythm. Pulmonary:      Effort: Pulmonary effort is normal. No respiratory distress. Breath sounds: No wheezing, rhonchi or rales. Abdominal:      General: Abdomen is flat. There is no distension. Palpations: Abdomen is soft. Musculoskeletal: Normal range of motion. General: No deformity. Right lower leg: No edema. Left lower leg: No edema. Skin:     Coloration: Skin is not jaundiced or pale. Findings: No erythema or rash. Neurological:      General: No focal deficit present. Mental Status: She is alert. Mental status is at baseline. Psychiatric:         Mood and Affect: Mood normal.         Behavior: Behavior normal.       Assessment/Plan:     June Rebollar was seen today for establish care. Diagnoses and all orders for this visit:    Encounter to establish care with new doctor  -     Comprehensive Metabolic Panel; Future  -     Hemoglobin A1C; Future  -     Lipid Panel; Future    Encounter for routine adult health examination without abnormal findings  -     Comprehensive Metabolic Panel; Future  -     Hemoglobin A1C; Future  -     Lipid Panel; Future    History of hypertension  Comments:  Patient's blood pressure is fine for now. I think her recent blood pressure spike is due to an infection that she is getting over. I advised her to bring a BP log on next visit. Medications Discontinued During This Encounter   Medication Reason    amoxicillin (AMOXIL) 500 MG capsule Therapy completed        Follow-up: Return in about 8 weeks (around 3/26/2020) for BP check. . Patient was informed that if his or her symptoms worsen to return here or go to nearest ER if symptoms significantly worsen.      Electronically signed by Argelia George MD on 1/30/2020 at 8:46 AM.

## 2020-01-30 ENCOUNTER — OFFICE VISIT (OUTPATIENT)
Dept: FAMILY MEDICINE CLINIC | Age: 55
End: 2020-01-30
Payer: COMMERCIAL

## 2020-01-30 VITALS
SYSTOLIC BLOOD PRESSURE: 124 MMHG | WEIGHT: 127 LBS | HEART RATE: 75 BPM | BODY MASS INDEX: 23.23 KG/M2 | DIASTOLIC BLOOD PRESSURE: 84 MMHG | OXYGEN SATURATION: 100 %

## 2020-01-30 DIAGNOSIS — Z76.89 ENCOUNTER TO ESTABLISH CARE WITH NEW DOCTOR: ICD-10-CM

## 2020-01-30 DIAGNOSIS — Z00.00 ENCOUNTER FOR ROUTINE ADULT HEALTH EXAMINATION WITHOUT ABNORMAL FINDINGS: ICD-10-CM

## 2020-01-30 LAB
A/G RATIO: 1.6 (ref 1.1–2.2)
ALBUMIN SERPL-MCNC: 4.5 G/DL (ref 3.4–5)
ALP BLD-CCNC: 40 U/L (ref 40–129)
ALT SERPL-CCNC: 11 U/L (ref 10–40)
ANION GAP SERPL CALCULATED.3IONS-SCNC: 12 MMOL/L (ref 3–16)
AST SERPL-CCNC: 14 U/L (ref 15–37)
BILIRUB SERPL-MCNC: 0.4 MG/DL (ref 0–1)
BUN BLDV-MCNC: 16 MG/DL (ref 7–20)
CALCIUM SERPL-MCNC: 9.1 MG/DL (ref 8.3–10.6)
CHLORIDE BLD-SCNC: 104 MMOL/L (ref 99–110)
CHOLESTEROL, TOTAL: 178 MG/DL (ref 0–199)
CO2: 24 MMOL/L (ref 21–32)
CREAT SERPL-MCNC: 0.7 MG/DL (ref 0.6–1.1)
GFR AFRICAN AMERICAN: >60
GFR NON-AFRICAN AMERICAN: >60
GLOBULIN: 2.8 G/DL
GLUCOSE BLD-MCNC: 98 MG/DL (ref 70–99)
HDLC SERPL-MCNC: 64 MG/DL (ref 40–60)
LDL CHOLESTEROL CALCULATED: 101 MG/DL
POTASSIUM SERPL-SCNC: 4.2 MMOL/L (ref 3.5–5.1)
SODIUM BLD-SCNC: 140 MMOL/L (ref 136–145)
TOTAL PROTEIN: 7.3 G/DL (ref 6.4–8.2)
TRIGL SERPL-MCNC: 66 MG/DL (ref 0–150)
VLDLC SERPL CALC-MCNC: 13 MG/DL

## 2020-01-30 PROCEDURE — 99386 PREV VISIT NEW AGE 40-64: CPT | Performed by: FAMILY MEDICINE

## 2020-01-30 ASSESSMENT — ENCOUNTER SYMPTOMS
SHORTNESS OF BREATH: 0
CHEST TIGHTNESS: 0
RHINORRHEA: 0
SINUS PRESSURE: 0
BLOOD IN STOOL: 0
ABDOMINAL DISTENTION: 0
BACK PAIN: 0
COUGH: 0
WHEEZING: 0
ABDOMINAL PAIN: 0
TROUBLE SWALLOWING: 0
VOMITING: 0
DIARRHEA: 0
CONSTIPATION: 0
NAUSEA: 0

## 2020-01-30 ASSESSMENT — PATIENT HEALTH QUESTIONNAIRE - PHQ9
SUM OF ALL RESPONSES TO PHQ9 QUESTIONS 1 & 2: 0
SUM OF ALL RESPONSES TO PHQ QUESTIONS 1-9: 0
1. LITTLE INTEREST OR PLEASURE IN DOING THINGS: 0
SUM OF ALL RESPONSES TO PHQ QUESTIONS 1-9: 0
2. FEELING DOWN, DEPRESSED OR HOPELESS: 0

## 2020-01-31 LAB
ESTIMATED AVERAGE GLUCOSE: 93.9 MG/DL
HBA1C MFR BLD: 4.9 %

## 2020-05-30 NOTE — PROGRESS NOTES
No Known Problems Maternal Grandfather     No Known Problems Paternal Grandmother     No Known Problems Paternal Grandfather     No Known Problems Other     Rheum Arthritis Neg Hx     Osteoarthritis Neg Hx     Asthma Neg Hx     Breast Cancer Neg Hx     Cancer Neg Hx     Diabetes Neg Hx     Heart Failure Neg Hx     High Cholesterol Neg Hx     Hypertension Neg Hx     Migraines Neg Hx     Ovarian Cancer Neg Hx     Rashes/Skin Problems Neg Hx     Seizures Neg Hx     Stroke Neg Hx     Thyroid Disease Neg Hx      Social History     Tobacco Use    Smoking status: Never Smoker    Smokeless tobacco: Never Used   Substance Use Topics    Alcohol use: No      Review of Systems   Constitutional: Negative for activity change, appetite change, fatigue, fever and unexpected weight change. HENT: Positive for ear pain and sore throat. Negative for congestion, ear discharge, facial swelling, hearing loss, mouth sores, rhinorrhea, sinus pressure, sinus pain, sneezing and trouble swallowing. Respiratory: Negative for cough, chest tightness, shortness of breath and wheezing. Cardiovascular: Negative for chest pain, palpitations and leg swelling. Gastrointestinal: Negative for abdominal distention, abdominal pain, blood in stool, constipation, diarrhea, nausea and vomiting. Genitourinary: Negative for dysuria, frequency and hematuria. Musculoskeletal: Negative for arthralgias and back pain. Skin: Negative for rash. Neurological: Negative for dizziness, weakness, light-headedness, numbness and headaches. Wt Readings from Last 3 Encounters:   01/30/20 127 lb (57.6 kg)   01/27/20 125 lb (56.7 kg)   08/01/19 129 lb (58.5 kg)     BP Readings from Last 3 Encounters:   01/30/20 124/84   01/27/20 139/89   08/01/19 134/80     Pulse Readings from Last 3 Encounters:   01/30/20 75   01/27/20 83   08/01/19 62     There were no vitals taken for this visit.    Physical Exam  Constitutional:       General: She is

## 2020-06-01 ENCOUNTER — VIRTUAL VISIT (OUTPATIENT)
Dept: FAMILY MEDICINE CLINIC | Age: 55
End: 2020-06-01
Payer: COMMERCIAL

## 2020-06-01 PROCEDURE — 99213 OFFICE O/P EST LOW 20 MIN: CPT | Performed by: FAMILY MEDICINE

## 2020-06-01 ASSESSMENT — ENCOUNTER SYMPTOMS
RHINORRHEA: 0
WHEEZING: 0
ABDOMINAL PAIN: 0
SORE THROAT: 1
SINUS PRESSURE: 0
ABDOMINAL DISTENTION: 0
NAUSEA: 0
DIARRHEA: 0
BACK PAIN: 0
CHEST TIGHTNESS: 0
FACIAL SWELLING: 0
SINUS PAIN: 0
VOMITING: 0
SHORTNESS OF BREATH: 0
COUGH: 0
TROUBLE SWALLOWING: 0
BLOOD IN STOOL: 0
CONSTIPATION: 0

## 2020-06-04 ENCOUNTER — HOSPITAL ENCOUNTER (OUTPATIENT)
Dept: ULTRASOUND IMAGING | Age: 55
Discharge: HOME | End: 2020-06-04
Payer: COMMERCIAL

## 2020-06-04 DIAGNOSIS — E55.9 VITAMIN D DEFICIENCY: ICD-10-CM

## 2020-06-04 DIAGNOSIS — R22.1 NECK MASS: ICD-10-CM

## 2020-06-04 DIAGNOSIS — L65.9 HAIR LOSS: ICD-10-CM

## 2020-06-04 LAB
C-REACTIVE PROTEIN: 0.9 MG/L (ref 0–5.1)
T4 FREE: 1.3 NG/DL (ref 0.9–1.8)
TSH REFLEX FT4: 2.63 UIU/ML (ref 0.27–4.2)
VITAMIN D 25-HYDROXY: 36.7 NG/ML

## 2020-06-04 PROCEDURE — 76536 US EXAM OF HEAD AND NECK: CPT

## 2020-06-09 ENCOUNTER — OFFICE VISIT (OUTPATIENT)
Dept: ENT CLINIC | Age: 55
End: 2020-06-09
Payer: COMMERCIAL

## 2020-06-09 VITALS
DIASTOLIC BLOOD PRESSURE: 93 MMHG | BODY MASS INDEX: 22.82 KG/M2 | HEART RATE: 116 BPM | TEMPERATURE: 97.6 F | WEIGHT: 124 LBS | SYSTOLIC BLOOD PRESSURE: 151 MMHG | HEIGHT: 62 IN

## 2020-06-09 PROCEDURE — 99203 OFFICE O/P NEW LOW 30 MIN: CPT | Performed by: OTOLARYNGOLOGY

## 2020-06-09 RX ORDER — AMOXICILLIN AND CLAVULANATE POTASSIUM 875; 125 MG/1; MG/1
1 TABLET, FILM COATED ORAL 2 TIMES DAILY
Qty: 20 TABLET | Refills: 0 | Status: SHIPPED | OUTPATIENT
Start: 2020-06-09 | End: 2020-06-19

## 2020-06-21 PROBLEM — H92.02 OTALGIA OF LEFT EAR: Status: ACTIVE | Noted: 2020-06-21

## 2020-06-21 PROBLEM — J02.9 ACUTE PHARYNGITIS: Status: ACTIVE | Noted: 2020-06-21

## 2020-06-21 PROBLEM — R09.A2 GLOBUS PHARYNGEUS: Status: ACTIVE | Noted: 2020-06-21

## 2020-06-21 PROBLEM — R09.89 GLOBUS PHARYNGEUS: Status: ACTIVE | Noted: 2020-06-21

## 2020-06-21 PROBLEM — J39.2 MASS OF OROPHARYNX: Status: ACTIVE | Noted: 2020-06-21

## 2020-06-26 ENCOUNTER — OFFICE VISIT (OUTPATIENT)
Dept: ENT CLINIC | Age: 55
End: 2020-06-26
Payer: COMMERCIAL

## 2020-06-26 VITALS
SYSTOLIC BLOOD PRESSURE: 113 MMHG | WEIGHT: 125 LBS | TEMPERATURE: 98.2 F | HEART RATE: 70 BPM | DIASTOLIC BLOOD PRESSURE: 78 MMHG | HEIGHT: 62 IN | BODY MASS INDEX: 23 KG/M2

## 2020-06-26 PROCEDURE — 31575 DIAGNOSTIC LARYNGOSCOPY: CPT | Performed by: OTOLARYNGOLOGY

## 2020-06-26 RX ORDER — OMEPRAZOLE 20 MG/1
CAPSULE, DELAYED RELEASE ORAL
Qty: 60 CAPSULE | Refills: 2 | Status: SHIPPED | OUTPATIENT
Start: 2020-06-26 | End: 2020-06-26

## 2020-06-26 RX ORDER — OMEPRAZOLE 20 MG/1
CAPSULE, DELAYED RELEASE ORAL
Qty: 180 CAPSULE | Refills: 0 | Status: SHIPPED | OUTPATIENT
Start: 2020-06-26 | End: 2021-04-26

## 2020-06-26 NOTE — PROGRESS NOTES
granting verbal informed consent. FINDINGS:  There was mild edema and erythema of the arytenoid and interarytenoid mucosa consistent with posterior laryngitis secondary to laryngopharyngeal reflux. The vocal cords appeared to be normal, with no nodule, ulceration, polyp, leukoplakia or other lesions. The vocal cords appeared to be normally mobile bilaterally with midline approximation on phonation. Sensation of the hypopharynx and larynx appeared to be normal when touched by the end of the flexible scope. The nasopharynx, eustachian tube orifices and fossa of Rosenmüller, oropharynx, base of tongue, hypopharynx, supraglottis, subglottis, and piriform sinuses all appeared to be normal, with no lesions. Visualization was excellent throughout the examination. Oropharynx: There was a lymphoid aggregate in the inferior left tonsillar fossa with no erythema or exudate, about 8-9 mm diameter and just above junction with tongue base. DESCRIPTION OF PROCEDURE:  The right and left nasal cavity was topically anesthetized and decongested with a 50-50 mixture of 0.05% oxymetazoline solution and topical 4% lidocaine solution by nasal sprayer, twice. After about ten minutes, the flexible fiberoptic nasopharyngolaryngoscope, with camera attached, was inserted through the right and left nare/nasal cavity and advanced to the nasopharynx and then to the hypopharynx and larynx. The [employer] video system was used. After adequate endoscopic visualization, the endoscope was removed. The patient appeared to tolerate the procedure well with no evidence of perioperative complications. Fahad Barraza / Kierra Hooker / Chriss Wen / Higinio Velázquez was seen today for laryngitis. Diagnoses and all orders for this visit:    LPRD (laryngopharyngeal reflux disease)    Chronic laryngitis    Otalgia of left ear    Mass of oropharynx         RECOMMENDATIONS / PLAN    1. Start BID Omeprazole  2.  Consider excision of lymphoid aggregate in inferior left tonsillar fossa if throat and ear symotms persist.  3. See patient instructions on file for this visit. 4. Return in about 3 months (around 9/26/2020) for repeat flexible fiberoptic throat endoscopy, recheck/follow-up, and sooner if condition worsens.

## 2020-09-24 ENCOUNTER — TELEPHONE (OUTPATIENT)
Dept: FAMILY MEDICINE CLINIC | Age: 55
End: 2020-09-24

## 2020-09-24 ENCOUNTER — VIRTUAL VISIT (OUTPATIENT)
Dept: FAMILY MEDICINE CLINIC | Age: 55
End: 2020-09-24
Payer: COMMERCIAL

## 2020-09-24 ENCOUNTER — NURSE TRIAGE (OUTPATIENT)
Dept: OTHER | Facility: CLINIC | Age: 55
End: 2020-09-24

## 2020-09-24 PROCEDURE — 99213 OFFICE O/P EST LOW 20 MIN: CPT | Performed by: FAMILY MEDICINE

## 2020-09-24 RX ORDER — NAPROXEN 500 MG/1
500 TABLET ORAL EVERY 12 HOURS PRN
Qty: 60 TABLET | Refills: 2 | Status: SHIPPED | OUTPATIENT
Start: 2020-09-24 | End: 2020-10-01 | Stop reason: ALTCHOICE

## 2020-09-24 RX ORDER — CYCLOBENZAPRINE HCL 10 MG
10 TABLET ORAL 3 TIMES DAILY PRN
Qty: 25 TABLET | Refills: 1 | Status: SHIPPED | OUTPATIENT
Start: 2020-09-24 | End: 2020-10-01 | Stop reason: ALTCHOICE

## 2020-09-24 ASSESSMENT — ENCOUNTER SYMPTOMS
DIARRHEA: 0
SHORTNESS OF BREATH: 0
WHEEZING: 0
RHINORRHEA: 0
COUGH: 0
CHEST TIGHTNESS: 0
CONSTIPATION: 0
SINUS PRESSURE: 0
TROUBLE SWALLOWING: 0
BACK PAIN: 1
ABDOMINAL PAIN: 0
ABDOMINAL DISTENTION: 0
NAUSEA: 0
VOMITING: 0
BLOOD IN STOOL: 0

## 2020-09-24 NOTE — PROGRESS NOTES
(56.7 kg)   06/09/20 124 lb (56.2 kg)   06/02/20 127 lb 12.8 oz (58 kg)     BP Readings from Last 3 Encounters:   06/26/20 113/78   06/09/20 (!) 151/93   06/02/20 124/82     Pulse Readings from Last 3 Encounters:   06/26/20 70   06/09/20 116   06/02/20 72       There were no vitals taken for this visit. Physical Exam ***  Assessment/Plan:     There are no diagnoses linked to this encounter. I reviewed the plan of care with the patient. Patient acknowledged understanding and agreed with plan of care overall. There are no discontinued medications. Patient was informed that whether starting or adding a new medication or increasing the dose of a current medication, the benefits and risks have to be considered and weighed over. Patient was also informed that there are no medications that have no side effects and if a side effect were to occur with starting a new medication or with increasing the dose of a current medication that either the medication can be totally discontinued altogether or simply decrease the dose of it and based on this, a follow-up appointment is necessary. The drug allergy list will then be updated with the corresponding side effects if it's deemed to be a true 'drug allergy'. The most common adverse effects of medication(s) were addressed at today's visit. Lastly, the patient was informed that the coverage status of a medication may vary from insurance to insurance and the only way to verify if the medication is covered is to send an actual prescription in. The patient was also informed that the cost of medications vary from insurance to insurance. ***    Estimated length of time of today's visit: ***minutes    PDMP Monitoring: ***  Follow-up: No follow-ups on file. . Patient was informed that if his or her symptoms worsen to return here or go to nearest ER if symptoms significantly worsen.      Disclaimer:  Rebecca Olvera is a 47 y.o. female is being evaluated by a virtual visit (video visit) and/or telephone (without video) encounter to address their concern(s) as mentioned above. Prior to arranging this appointment, the patient was made aware of the need and importance to schedule the visit in this manner given the current ongoing COVID-19 pandemic. The patient was also made aware that the telemedicine service used (e.g.doxy. me) would not save let alone record any information (audio, video, and text) regarding the actual virtual visit. After this discussion, the patient acknowledged understanding and agreed to today's virtual visit encounter. A caregiver was present when appropriate as well as an  if requested. Due to this being a TeleHealth encounter ([facility] the [facility]), evaluation of the following organ systems was overall limited: Vitals/Constitutional/EENT/Resp/CV/GI//MS/Neuro/Skin/Heme-Lymph-Imm. As a result, establishing a diagnosis(s) and formulating a plan of care may be overall more difficult and complicated compared to a conventional (face-to-face) office visit. The patient was made aware about the potential limitations and difficulties of a telemedicine visit such as having technical difficulties related to video and/or audio issues often stemming from a sub-optimal/poor Internet or cellular data connection and/or other factors. If this is judged to be the case then the patient would be informed if deemed relevant and necessary that an in-person follow-up visit may be recommended. Pursuant to the emergency declaration under the [facility], [address] [place] [address] authority and the [employer] and [employer] Act, this virtual visit was conducted with the patient's (and/or legal guardian's) consent, to reduce the patient's risk (as well as others) of exposure to COVID-19 and to continue to maintain and provide necessary medical care.   The patient (and/or legal guardian) has also been advised to contact this office for worsening conditions or problems, and seek emergency medical treatment and/or call 911 if deemed necessary. Services were provided through either a video synchronous discussion virtually or via telephone (without video) or combination of both to substitute for in-person clinic visit. Patient and provider were located at their individual home(s) or their most convenient location at the time of visit. Gurinder Yost MD on 9/24/2020 at [address]    An electronic signature was used to authenticate this note.

## 2020-09-24 NOTE — TELEPHONE ENCOUNTER
Reason for Disposition   Age > 48 and no history of prior similar back pain    Answer Assessment - Initial Assessment Questions  1. ONSET: \"When did the pain begin? \"       Back pain started yesterday morning  2. LOCATION: \"Where does it hurt? \" (upper, mid or lower back)  Pain at her waist area on left side, feels tight  3. SEVERITY: \"How bad is the pain? \"  (e.g., Scale 1-10; mild, moderate, or severe)    - MILD (1-3): doesn't interfere with normal activities     - MODERATE (4-7): interferes with normal activities or awakens from sleep     - SEVERE (8-10): excruciating pain, unable to do any normal activities   Feels tight, unable to stand straight. Pain is 3/10  4. PATTERN: \"Is the pain constant? \" (e.g., yes, no; constant, intermittent)   constant  5. RADIATION: \"Does the pain shoot into your legs or elsewhere? \"  No radiating pain  6. CAUSE:  \"What do you think is causing the back pain? \"   Adam Para several years ago and messed up her back. 7. BACK OVERUSE:  Alondra Nayak recent lifting of heavy objects, strenuous work or exercise?\"    8. MEDICATIONS: \"What have you taken so far for the pain? \" (e.g., nothing, acetaminophen, NSAIDS)   taking Naproxen and Flexeril  9. NEUROLOGIC SYMPTOMS: \"Do you have any weakness, numbness, or problems with bowel/bladder control? \"    10. OTHER SYMPTOMS: \"Do you have any other symptoms? \" (e.g., fever, abdominal pain, burning with urination, blood in urine)  No other symptoms  11. PREGNANCY: \"Is there any chance you are pregnant? \" (e.g., yes, no; LMP)    Protocols used: BACK PAIN-ADULT-[place]  Received call from [facility]. Pt calling with lower back pain since yesterday. Unable to stand up straight. Hard to sit. Pain today is 3/10. No other symptoms. Recommend pt is seen today, call sooner if worsens. Call soft transferred to [address] to schedule appointment. Attention Provider: Thank you for allowing me to participate in the care of your patient.  The patient was connected to triage in response to information provided to the [facility]. Please do not respond through this encounter as the response is not directed to a shared pool.

## 2020-09-24 NOTE — PROGRESS NOTES
but was on atenolol in the past.     Allergies   Allergen Reactions    Erythromycin Other (See Comments)     fever    Influenza Vaccines     Sulfamethoxazole-Trimethoprim     Cephalexin Rash    Sulfa Antibiotics Hives and Rash       Current Outpatient Medications on File Prior to Visit   Medication Sig Dispense Refill    omeprazole (PRILOSEC) 20 MG delayed release capsule Take 1 capsule by mouth 2 times daily; take on an empty stomach and eat a meal or snack 45 to 60 minutes hour after each dose. 180 capsule 0     No current facility-administered medications on file prior to visit. Family History   Problem Relation Age of Onset    No Known Problems Mother     Other Father     Coronary Art Dis Father     No Known Problems Sister     No Known Problems Brother     No Known Problems Maternal Aunt     No Known Problems Maternal Uncle     No Known Problems Paternal Aunt     No Known Problems Paternal Uncle     No Known Problems Maternal Grandmother     No Known Problems Maternal Grandfather     No Known Problems Paternal Grandmother     No Known Problems Paternal Grandfather     No Known Problems Other     Rheum Arthritis Neg Hx     Osteoarthritis Neg Hx     Asthma Neg Hx     Breast Cancer Neg Hx     Cancer Neg Hx     Diabetes Neg Hx     Heart Failure Neg Hx     High Cholesterol Neg Hx     Hypertension Neg Hx     Migraines Neg Hx     Ovarian Cancer Neg Hx     Rashes/Skin Problems Neg Hx     Seizures Neg Hx     Stroke Neg Hx     Thyroid Disease Neg Hx      Social History     Tobacco Use    Smoking status: Never Smoker    Smokeless tobacco: Never Used   Substance Use Topics    Alcohol use: No      Review of Systems   Constitutional: Negative for activity change, appetite change, fatigue, fever and unexpected weight change. HENT: Negative for congestion, rhinorrhea, sinus pressure and trouble swallowing.     Respiratory: Negative for cough, chest tightness, shortness of breath and wheezing. Cardiovascular: Negative for chest pain, palpitations and leg swelling. Gastrointestinal: Negative for abdominal distention, abdominal pain, blood in stool, constipation, diarrhea, nausea and vomiting. Genitourinary: Negative for dysuria, frequency and hematuria. Musculoskeletal: Positive for arthralgias and back pain. Negative for gait problem, joint swelling, myalgias, neck pain and neck stiffness. Skin: Negative for rash. Neurological: Negative for dizziness, weakness, light-headedness, numbness and headaches. Wt Readings from Last 3 Encounters:   06/26/20 125 lb (56.7 kg)   06/09/20 124 lb (56.2 kg)   06/02/20 127 lb 12.8 oz (58 kg)     BP Readings from Last 3 Encounters:   06/26/20 113/78   06/09/20 (!) 151/93   06/02/20 124/82     Pulse Readings from Last 3 Encounters:   06/26/20 70   06/09/20 116   06/02/20 72       There were no vitals taken for this visit. Physical Exam  Vitals signs reviewed. Constitutional:       General: She is awake. She is not in acute distress. Appearance: Normal appearance. She is not ill-appearing. HENT:      Head: Normocephalic and atraumatic. Right Ear: External ear normal.      Left Ear: External ear normal.      Nose: Nose normal.   Eyes:      General: No scleral icterus. Right eye: No discharge. Left eye: No discharge. Extraocular Movements: Extraocular movements intact. Conjunctiva/sclera: Conjunctivae normal.   Neck:      Musculoskeletal: Normal range of motion. No erythema. Pulmonary:      Effort: Pulmonary effort is normal. No respiratory distress. Breath sounds: No stridor. No wheezing. Abdominal:      General: There is no distension. Musculoskeletal: Normal range of motion. Skin:     Coloration: Skin is not cyanotic, jaundiced or pale. Findings: No erythema. Neurological:      General: No focal deficit present. Mental Status: She is alert and oriented to person, place, and time. Mental status is at baseline. Psychiatric:         Attention and Perception: Attention and perception normal.         Mood and Affect: Mood and affect normal.         Speech: Speech normal.         Behavior: Behavior normal. Behavior is cooperative. Thought Content: Thought content normal.        Assessment/Plan:     Cash Jacinto was seen today for lower back pain. Diagnoses and all orders for this visit:    Acute left-sided low back pain without sciatica  -     diclofenac sodium (VOLTAREN) 1 % GEL; Apply 2 g topically every 6 hours as needed for Pain (arthritis)  -     cyclobenzaprine (FLEXERIL) 10 MG tablet; Take 1 tablet by mouth 3 times daily as needed for Muscle spasms  -     naproxen (NAPROSYN) 500 MG tablet; Take 1 tablet by mouth every 12 hours as needed for Pain    Elevated blood pressure reading  Comments:  Informed patient BP goal < 130/80, monitor BP, low salt diet, and exercise as much as possible. I reviewed the plan of care with the patient. Patient acknowledged understanding and agreed with plan of care overall. There are no discontinued medications. Patient was informed that whether starting or adding a new medication or increasing the dose of a current medication, the benefits and risks have to be considered and weighed over. Patient was also informed that there are no medications that have no side effects and if a side effect were to occur with starting a new medication or with increasing the dose of a current medication that either the medication can be totally discontinued altogether or simply decrease the dose of it and based on this, a follow-up appointment is necessary. The drug allergy list will then be updated with the corresponding side effects if it's deemed to be a true 'drug allergy'. The most common adverse effects of medication(s) were addressed at today's visit.   Lastly, the patient was informed that the coverage status of a medication may vary from insurance to insurance and the only way to verify if the medication is covered is to send an actual prescription in. The patient was also informed that the cost of medications vary from insurance to insurance. Estimated length of time of today's visit: 15 minutes    Follow-up: Return if symptoms worsen or fail to improve. . Patient was informed that if his or her symptoms worsen to return here or go to nearest ER if symptoms significantly worsen. Disclaimer:  Sheridan Carter is a 47 y.o. female is being evaluated by a virtual visit (video visit) and/or telephone (without video) encounter to address their concern(s) as mentioned above. Prior to arranging this appointment, the patient was made aware of the need and importance to schedule the visit in this manner given the current ongoing COVID-19 pandemic. The patient was also made aware that the telemedicine service used (e.g.doxy. me) would not save let alone record any information (audio, video, and text) regarding the actual virtual visit. After this discussion, the patient acknowledged understanding and agreed to today's virtual visit encounter. A caregiver was present when appropriate as well as an  if requested. Due to this being a TeleHealth encounter (during the [facility]-58 public health emergency), evaluation of the following organ systems was overall limited: Vitals/Constitutional/EENT/Resp/CV/GI//MS/Neuro/Skin/Heme-Lymph-Imm. As a result, establishing a diagnosis(s) and formulating a plan of care may be overall more difficult and complicated compared to a conventional (face-to-face) office visit. The patient was made aware about the potential limitations and difficulties of a telemedicine visit such as having technical difficulties related to video and/or audio issues often stemming from a sub-optimal/poor Internet or cellular data connection and/or other factors.   If this is judged to be the case then the patient would be informed if deemed relevant and necessary that an in-person follow-up visit may be recommended. Pursuant to the emergency declaration under the [address] [place] [address] and the [employer] Act, this virtual visit was conducted with the patient's (and/or legal guardian's) consent, to reduce the patient's risk (as well as others) of exposure to COVID-19 and to continue to maintain and provide necessary medical care. The patient (and/or legal guardian) has also been advised to contact this office for worsening conditions or problems, and seek emergency medical treatment and/or call 911 if deemed necessary. Services were provided through either a video synchronous discussion virtually or via telephone (without video) or combination of both to substitute for in-person clinic visit. Patient and provider were located at their individual home(s) or their most convenient location at the time of visit. Gurinder Delcid MD on 9/24/2020 at [address] [place] [address]    An electronic signature was used to authenticate this note.

## 2020-09-25 ENCOUNTER — EMPLOYEE WELLNESS (OUTPATIENT)
Dept: OTHER | Age: 55
End: 2020-09-25

## 2020-09-25 LAB
CHOLESTEROL, TOTAL: 192 MG/DL (ref 0–199)
GLUCOSE BLD-MCNC: 85 MG/DL (ref 70–99)
HDLC SERPL-MCNC: 61 MG/DL (ref 40–60)
LDL CHOLESTEROL CALCULATED: 118 MG/DL
TRIGL SERPL-MCNC: 66 MG/DL (ref 0–150)

## 2020-10-01 ENCOUNTER — VIRTUAL VISIT (OUTPATIENT)
Dept: FAMILY MEDICINE CLINIC | Age: 55
End: 2020-10-01
Payer: COMMERCIAL

## 2020-10-01 PROCEDURE — 99213 OFFICE O/P EST LOW 20 MIN: CPT | Performed by: FAMILY MEDICINE

## 2020-10-01 RX ORDER — CHLORTHALIDONE 25 MG/1
25 TABLET ORAL DAILY
Qty: 30 TABLET | Refills: 2 | Status: SHIPPED | OUTPATIENT
Start: 2020-10-01 | End: 2021-04-26

## 2020-10-01 ASSESSMENT — ENCOUNTER SYMPTOMS
CHEST TIGHTNESS: 0
SHORTNESS OF BREATH: 0
TROUBLE SWALLOWING: 0
SINUS PRESSURE: 0
VOMITING: 0
RHINORRHEA: 0
BLOOD IN STOOL: 0
BACK PAIN: 0
ABDOMINAL PAIN: 0
NAUSEA: 0
COUGH: 0
CONSTIPATION: 0
DIARRHEA: 0
WHEEZING: 0
ABDOMINAL DISTENTION: 0

## 2020-10-01 NOTE — PROGRESS NOTES
Anna Emmanuel   47 y.o. female   1965    HPI:  Chief Complaint   Patient presents with    Hypertension    Discuss Medications       Virtual visit encounter type:   [x] Doxy. me  [] Telephone w/o video  [] Ryleehart  [] Other: This patient was last seen by me on 9/24/2020. During that visit, she told me about having some back pain issues. I had prescribed naproxen and cyclobenzaprine and she had taken both in the following day she is symptomatically much better. She no longer has any back pain issues. She feels greatly relieved. She made a same-day appointment regarding concerns about labile blood pressure readings. She stated that when she was in her 20-30's, her blood pressure was typically 110/60. She reported that she hardly ever saw her diastolic blood pressures being 80 or higher. She has no prior history of CAD or CVA. She stated that over the past several months since the pandemic, she has had increased stress related to work as well as her son being in college ([address] [place] [address]). She noted of recent blood pressure readings over the past week of SBP of 150-160's; however, after lying down for some time, her BP can get down to 130's or even 110's. Patient stated that she mainly just drinks 1 cup of [employer] coffee a day. She has no history of illicit drug use of any kind. Patient recounted that she was on atenolol 25 mg for about 18 months because of report of palpitation and tachycardia issues. She did report that that few times a month she would have random episodes of her heart pounding enough where she would wake up in the middle of the night. 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 She also admitted to not being physically active over the past several months since the pandemic started in March 2020.     Allergies   Allergen Reactions    Erythromycin Other (See Comments)     fever  Influenza Vaccines     Sulfamethoxazole-Trimethoprim     Cephalexin Rash    Sulfa Antibiotics Hives and Rash       Current Outpatient Medications on File Prior to Visit   Medication Sig Dispense Refill    omeprazole (PRILOSEC) 20 MG delayed release capsule Take 1 capsule by mouth 2 times daily; take on an empty stomach and eat a meal or snack 45 to 60 minutes hour after each dose. 180 capsule 0    diclofenac sodium (VOLTAREN) 1 % GEL Apply 2 g topically every 6 hours as needed for Pain (arthritis) 1 Tube 2     No current facility-administered medications on file prior to visit. Family History   Problem Relation Age of Onset    No Known Problems Mother     Other Father     Coronary Art Dis Father     No Known Problems Sister     No Known Problems Brother     No Known Problems Maternal Aunt     No Known Problems Maternal Uncle     No Known Problems Paternal Aunt     No Known Problems Paternal Uncle     No Known Problems Maternal Grandmother     No Known Problems Maternal Grandfather     No Known Problems Paternal Grandmother     No Known Problems Paternal Grandfather     No Known Problems Other     Rheum Arthritis Neg Hx     Osteoarthritis Neg Hx     Asthma Neg Hx     Breast Cancer Neg Hx     Cancer Neg Hx     Diabetes Neg Hx     Heart Failure Neg Hx     High Cholesterol Neg Hx     Hypertension Neg Hx     Migraines Neg Hx     Ovarian Cancer Neg Hx     Rashes/Skin Problems Neg Hx     Seizures Neg Hx     Stroke Neg Hx     Thyroid Disease Neg Hx      Social History     Tobacco Use    Smoking status: Never Smoker    Smokeless tobacco: Never Used   Substance Use Topics    Alcohol use: No      Review of Systems   Constitutional: Negative for activity change, appetite change, fatigue, fever and unexpected weight change. HENT: Negative for congestion, rhinorrhea, sinus pressure and trouble swallowing.     Respiratory: Negative for cough, chest tightness, shortness of breath and wheezing. Cardiovascular: Negative for chest pain, palpitations and leg swelling. Gastrointestinal: Negative for abdominal distention, abdominal pain, blood in stool, constipation, diarrhea, nausea and vomiting. Genitourinary: Negative for dysuria, frequency and hematuria. Musculoskeletal: Negative for arthralgias and back pain. Skin: Negative for rash. Neurological: Negative for dizziness, weakness, light-headedness, numbness and headaches. Wt Readings from Last 3 Encounters:   06/26/20 125 lb (56.7 kg)   06/09/20 124 lb (56.2 kg)   06/02/20 127 lb 12.8 oz (58 kg)     BP Readings from Last 3 Encounters:   06/26/20 113/78   06/09/20 (!) 151/93   06/02/20 124/82     Pulse Readings from Last 3 Encounters:   06/26/20 70   06/09/20 116   06/02/20 72       VS: BP: 132/97. Pulse - 87. Physical Exam  Vitals signs reviewed. Constitutional:       General: She is awake. She is not in acute distress. Appearance: She is not ill-appearing or diaphoretic. HENT:      Head: Normocephalic and atraumatic. Right Ear: External ear normal.      Left Ear: External ear normal.      Nose: Nose normal.   Eyes:      Extraocular Movements: Extraocular movements intact. Conjunctiva/sclera: Conjunctivae normal.   Neck:      Musculoskeletal: Normal range of motion. No erythema. Cardiovascular:      Rate and Rhythm: Normal rate and regular rhythm. Pulmonary:      Effort: Pulmonary effort is normal. No respiratory distress. Breath sounds: No wheezing, rhonchi or rales. Abdominal:      General: Abdomen is flat. There is no distension. Palpations: Abdomen is soft. Musculoskeletal: Normal range of motion. General: No deformity. Right lower leg: No edema. Left lower leg: No edema. Skin:     Coloration: Skin is not cyanotic, jaundiced or pale. Findings: No abrasion, abscess, bruising, ecchymosis, erythema, signs of injury, laceration, lesion, petechiae, rash or wound. Neurological:      General: No focal deficit present. Mental Status: She is alert. Mental status is at baseline. Coordination: Coordination normal.   Psychiatric:         Attention and Perception: Attention and perception normal.         Mood and Affect: Mood and affect normal.         Speech: Speech normal.         Behavior: Behavior normal. Behavior is cooperative. Thought Content: Thought content normal.          Assessment/Plan:       ICD-10-CM    1. Essential hypertension  I10 chlorthalidone (HYGROTON) 25 MG tablet    Discussed about different diets that have positive impact on HTN (DASH & Mediterranean). Exercise goal - >150 min/week. 2. Palpitations  R00.2    3. LDL-c greater than or equal to 100 mg/dl  Z78.9     ASCVD M< 7.5 = no statin indicated. Advised to decrease her cholesterol intake, smaller meals, exercise       I reviewed the plan of care with the patient. Patient acknowledged understanding and agreed with plan of care overall. Medications Discontinued During This Encounter   Medication Reason    naproxen (NAPROSYN) 500 MG tablet Therapy completed    cyclobenzaprine (FLEXERIL) 10 MG tablet Therapy completed        Patient was informed that whether starting or adding a new medication or increasing the dose of a current medication, the benefits and risks have to be considered and weighed over. Patient was also informed that there are no medications that have no side effects and if a side effect were to occur with starting a new medication or with increasing the dose of a current medication that either the medication can be totally discontinued altogether or simply decrease the dose of it and based on this, a follow-up appointment is necessary. The drug allergy list will then be updated with the corresponding side effects if it's deemed to be a true 'drug allergy'. The most common adverse effects of medication(s) were addressed at today's visit.   Lastly, the patient was informed that the coverage status of a medication may vary from insurance to insurance and the only way to verify if the medication is covered is to send an actual prescription in. The patient was also informed that the cost of medications vary from insurance to insurance. Estimated length of time of today's visit: 15 minutes    Follow-up: Return in about 4 weeks (around 10/29/2020) for HTN - VV. Tere Bernal Patient was informed that if his or her symptoms worsen to return here or go to nearest ER if symptoms significantly worsen. Disclaimer:  Vevelyn Canavan is a 47 y.o. female is being evaluated by a virtual visit (video visit) and/or telephone (without video) encounter to address their concern(s) as mentioned above. Prior to arranging this appointment, the patient was made aware of the need and importance to schedule the visit in this manner given the current ongoing COVID-19 pandemic. The patient was also made aware that the telemedicine service used (e.g.doxy. me) would not save let alone record any information (audio, video, and text) regarding the actual virtual visit. After this discussion, the patient acknowledged understanding and agreed to today's virtual visit encounter. A caregiver was present when appropriate as well as an  if requested. Due to this being a TeleHealth encounter (during the RPK-59 public health emergency), evaluation of the following organ systems was overall limited: Vitals/Constitutional/EENT/Resp/CV/GI//MS/Neuro/Skin/Heme-Lymph-Imm. As a result, establishing a diagnosis(s) and formulating a plan of care may be overall more difficult and complicated compared to a conventional (face-to-face) office visit.   The patient was made aware about the potential limitations and difficulties of a telemedicine visit such as having technical difficulties related to video and/or audio issues often stemming from a sub-optimal/poor Internet or cellular data connection and/or other factors. If this is judged to be the case then the patient would be informed if deemed relevant and necessary that an in-person follow-up visit may be recommended. Pursuant to the emergency declaration under the [facility] [address] authority and the [employer] Act, this virtual visit was conducted with the patient's (and/or legal guardian's) consent, to reduce the patient's risk (as well as others) of exposure to COVID-19 and to continue to maintain and provide necessary medical care. The patient (and/or legal guardian) has also been advised to contact this office for worsening conditions or problems, and seek emergency medical treatment and/or call 911 if deemed necessary. Services were provided through either a video synchronous discussion virtually or via telephone (without video) or combination of both to substitute for in-person clinic visit. Patient and provider were located at their individual home(s) or their most convenient location at the time of visit. Gurinder Moore MD on 10/1/2020 at 5:25 PM  [address]    An electronic signature was used to authenticate this note.

## 2020-10-01 NOTE — PATIENT INSTRUCTIONS
Patient Education        DASH Diet: Care Instructions  Your Care Instructions     The DASH diet is an eating plan that can help lower your blood pressure. DASH stands for Dietary Approaches to Stop Hypertension. Hypertension is high blood pressure. The DASH diet focuses on eating foods that are high in calcium, potassium, and magnesium. These nutrients can lower blood pressure. The foods that are highest in these nutrients are fruits, vegetables, low-fat dairy products, nuts, seeds, and legumes. But taking calcium, potassium, and magnesium supplements instead of eating foods that are high in those nutrients does not have the same effect. The DASH diet also includes whole grains, fish, and poultry. The DASH diet is one of several lifestyle changes your doctor may recommend to lower your high blood pressure. Your doctor may also want you to decrease the amount of sodium in your diet. Lowering sodium while following the DASH diet can lower blood pressure even further than just the DASH diet alone. Follow-up care is a key part of your treatment and safety. Be sure to make and go to all appointments, and call your doctor if you are having problems. It's also a good idea to know your test results and keep a list of the medicines you take. How can you care for yourself at home? Following the DASH diet  · Eat 4 to 5 servings of fruit each day. A serving is 1 medium-sized piece of fruit, ½ cup chopped or canned fruit, 1/4 cup dried fruit, or 4 ounces (½ cup) of fruit juice. Choose fruit more often than fruit juice. · Eat 4 to 5 servings of vegetables each day. A serving is 1 cup of lettuce or raw leafy vegetables, ½ cup of chopped or cooked vegetables, or 4 ounces (½ cup) of vegetable juice. Choose vegetables more often than vegetable juice. · Get 2 to 3 servings of low-fat and fat-free dairy each day. A serving is 8 ounces of milk, 1 cup of yogurt, or 1 ½ ounces of cheese. · Eat 6 to 8 servings of grains each day.  A serving is 1 slice of bread, 1 ounce of dry cereal, or ½ cup of cooked rice, pasta, or cooked cereal. Try to choose whole-grain products as much as possible. · Limit lean meat, poultry, and fish to 2 servings each day. A serving is 3 ounces, about the size of a deck of cards. · Eat 4 to 5 servings of nuts, seeds, and legumes (cooked dried beans, lentils, and split peas) each week. A serving is 1/3 cup of nuts, 2 tablespoons of seeds, or ½ cup of cooked beans or peas. · Limit fats and oils to 2 to 3 servings each day. A serving is 1 teaspoon of vegetable oil or 2 tablespoons of salad dressing. · Limit sweets and added sugars to 5 servings or less a week. A serving is 1 tablespoon jelly or jam, ½ cup sorbet, or 1 cup of lemonade. · Eat less than 2,300 milligrams (mg) of sodium a day. If you limit your sodium to 1,500 mg a day, you can lower your blood pressure even more. Tips for success  · Start small. Do not try to make dramatic changes to your diet all at once. You might feel that you are missing out on your favorite foods and then be more likely to not follow the plan. Make small changes, and stick with them. Once those changes become habit, add a few more changes. · Try some of the following:  ? Make it a goal to eat a fruit or vegetable at every meal and at snacks. This will make it easy to get the recommended amount of fruits and vegetables each day. ? Try yogurt topped with fruit and nuts for a snack or healthy dessert. ? Add lettuce, tomato, cucumber, and onion to sandwiches. ? Combine a ready-made pizza crust with low-fat mozzarella cheese and lots of vegetable toppings. Try using tomatoes, squash, spinach, broccoli, carrots, cauliflower, and onions. ? Have a variety of cut-up vegetables with a low-fat dip as an appetizer instead of chips and dip. ? Sprinkle sunflower seeds or chopped almonds over salads. Or try adding chopped walnuts or almonds to cooked vegetables.   ? Try some vegetarian meals using beans and peas. Add garbanzo or kidney beans to salads. Make burritos and tacos with mashed meek beans or black beans. Where can you learn more? Go to https://chabbieeb.[employer] B E. org and sign in to your [employer] account. Enter K899 in the [facility] box to learn more about \"DASH Diet: Care Instructions. \"     If you do not have an account, please click on the \"Sign Up Now\" link. Current as of: December 16, 2019               Content Version: 12.5  © 0746-0897 [employer]wise, Incorporated. Care instructions adapted under license by [facility] ([facility]). If you have questions about a medical condition or this instruction, always ask your healthcare professional. [address] any warranty or liability for your use of this information.

## 2020-10-19 VITALS — BODY MASS INDEX: 23.96 KG/M2 | WEIGHT: 131 LBS

## 2021-04-24 NOTE — PROGRESS NOTES
Leilani Duran   54 y.o. female   1965    HPI:    Patient was last seen by me on 10/1/2020. During our last office visit:  -I had prescribed Chlorthalidone 25 mg for HTN - #30 with 2 refills. Reason(s) for visit:   Chief Complaint   Patient presents with    Annual Exam    Labs Only     Pt would like thyroid antibodies checked       Patient that she took Chlorthalidone for 2 days and \"hated it\". She exercised (stair master and walking) and took time off. Her niece (maternal), who is 29years old was just tested for thyroid labs and she was positive for Hashimoto's. Her sister. As result, she requested thyroid antibody testing. She also reported that her hair issues over past 8 years. No one in the family has premature alopecia/general hair loss issues. She has tried taking different vitamins including Biotin. She's had 7 lb weight gain. She reported that she feels overall fine in terms of stamina, but at times she has palpitations and not feeling herself. Last period was 2 weeks ago. Generally, has no hot flashes, but has had \"a lot of chills\" lately. She stated that she still has intermittent episodes of palpitations that occur only when lying down in the evening. She has felt her \"heart pounding\" and when she has checked her HR, it has been around 120 bpm.  She denied excessive caffeine intake and her overall issues with coping with stress/anxiety have gotten better. She had no interest with getting COVID-19 vaccine and will get Shingrix vaccine at some other point.       Allergies   Allergen Reactions    Erythromycin Other (See Comments)     fever    Influenza Vaccines     Sulfamethoxazole-Trimethoprim     Cephalexin Rash    Sulfa Antibiotics Hives and Rash       Current Outpatient Medications on File Prior to Visit   Medication Sig Dispense Refill    diclofenac sodium (VOLTAREN) 1 % GEL Apply 2 g topically every 6 hours as needed for Pain (arthritis) 1 Tube 2     No current Review of Systems   Constitutional: Negative for activity change, appetite change, fatigue, fever and unexpected weight change. HENT: Negative for congestion, rhinorrhea, sinus pressure and trouble swallowing. Respiratory: Negative for cough, chest tightness, shortness of breath and wheezing. Cardiovascular: Negative for chest pain, palpitations and leg swelling. Gastrointestinal: Negative for abdominal distention, abdominal pain, blood in stool, constipation, diarrhea, nausea and vomiting. Genitourinary: Negative for dysuria, frequency and hematuria. Musculoskeletal: Negative for arthralgias and back pain. Skin: Negative for rash. Neurological: Negative for dizziness, weakness, light-headedness, numbness and headaches. Wt Readings from Last 3 Encounters:   04/26/21 132 lb 12.8 oz (60.2 kg)   09/25/20 131 lb (59.4 kg)   06/26/20 125 lb (56.7 kg)       BP Readings from Last 3 Encounters:   04/26/21 118/78   06/26/20 113/78   06/09/20 (!) 151/93       Pulse Readings from Last 3 Encounters:   04/26/21 77   06/26/20 70   06/09/20 116       /78   Pulse 77   Temp 97 °F (36.1 °C)   Ht 5' 2\" (1.575 m)   Wt 132 lb 12.8 oz (60.2 kg)   SpO2 97%   BMI 24.29 kg/m²      Physical Exam  Vitals signs reviewed. Constitutional:       General: She is awake. She is not in acute distress. Appearance: She is not ill-appearing or diaphoretic. HENT:      Head: Normocephalic and atraumatic. Right Ear: External ear normal.      Left Ear: External ear normal.      Nose: Nose normal.   Eyes:      General: Lids are normal. Gaze aligned appropriately. No scleral icterus. Right eye: No discharge. Left eye: No discharge. Extraocular Movements: Extraocular movements intact. Conjunctiva/sclera: Conjunctivae normal.   Neck:      Musculoskeletal: Normal range of motion. No erythema.       Trachea: Phonation normal.   Cardiovascular:      Rate and Rhythm: Normal rate and regular rhythm. Pulmonary:      Effort: Pulmonary effort is normal. No respiratory distress. Breath sounds: No wheezing, rhonchi or rales. Abdominal:      General: Abdomen is flat. There is no distension. Palpations: Abdomen is soft. Musculoskeletal: Normal range of motion. General: No deformity. Right lower leg: No edema. Left lower leg: No edema. Skin:     Coloration: Skin is not cyanotic, jaundiced or pale. Findings: No abrasion, abscess, bruising, ecchymosis, erythema, signs of injury, laceration, lesion, petechiae, rash or wound. Neurological:      General: No focal deficit present. Mental Status: She is alert. Mental status is at baseline. GCS: GCS eye subscore is 4. GCS verbal subscore is 5. GCS motor subscore is 6. Coordination: Coordination normal.      Gait: Gait is intact. Psychiatric:         Attention and Perception: Attention and perception normal.         Mood and Affect: Mood and affect normal.         Speech: Speech normal.         Behavior: Behavior normal. Behavior is cooperative. Thought Content: Thought content normal.       Assessment/Plan:   Judah Caceres was seen today for annual exam and labs only. Diagnoses and all orders for this visit:    Encounter for preventative adult health care examination  -     CBC Auto Differential; Future  -     Hemoglobin A1C; Future  -     TSH without Reflex; Future  -     T4, Free; Future  -     Renal Function Panel; Future  -     Hepatic Function Panel; Future  -     Lipid Panel; Future  -     TRUNG DIGITAL SCREEN W OR WO CAD BILATERAL; Future  -     Lipid Panel  -     Hepatic Function Panel  -     Renal Function Panel  -     T4, Free  -     TSH without Reflex  -     Hemoglobin A1C  -     CBC Auto Differential    Essential hypertension  Comments:  BP stable. Exercise as much as possible. Low salt diet. Chronic fatigue  -     CBC Auto Differential; Future  -     TSH without Reflex;  Future  - addressed and completed in a timely fashion (ideally within 72 hours of the patient encounter). It is also worth noting that healthcare providers often see several patients a day (e.g. > 15-30 patients/day) in either the outpatient and/or inpatient setting(s). In addition, healthcare providers spend a significant amount of time reviewing multiple patients' charts in preparation for their future encounters (pre-charting) as well as time spent reviewing any labs, imaging, specialist's notes (if relevant), and other documents (e.g. recent ER visits or hospital stays or completing forms such as FMLA, short-term/long-term disability), etc.  Time is also allocated to attending to patient's messages on [employer], phone calls from various people, attending to prescription refills/orders, and also attending meetings or conferences throughout the day. Time and effort is also allocated to coordinating with office staff to make sure various things are completed as part of the day-to-day office management responsibilities. For the most part, substantial portion of each given day is usually spent with direct patient care followed by documenting. Given all this, it is worth pointing out that not every detail of the encounter can be remembered and not everything discussed is considered relevant, significant, or noteworthy. It is also worth mentioning that my recollection of the visit may differ from the respective patient's recollection of the visit. This note is primarily written for myself (the healthcare provider) utilizing one of my own customized templates so I can recall what happened during that visit and may be used for future reference for myself to prepare for follow up appointments.   My note is also written in mind for other healthcare professionals (who have their own extensive medical background and experience) for their own understanding (of what happened during the visit) and also more importantly to hopefully help influence and play a role in formulating their plan of care along with their own unique medical expertise. If one has any questions or concerns about my given note, please take into consideration all these aforementioned things before contacting. Gurinder Anderson M.D.   [address]    Electronically signed by Diana Olivas on 4/26/2021 at 8:34 AM.

## 2021-04-26 ENCOUNTER — OFFICE VISIT (OUTPATIENT)
Dept: FAMILY MEDICINE CLINIC | Age: 56
End: 2021-04-26
Payer: COMMERCIAL

## 2021-04-26 VITALS
DIASTOLIC BLOOD PRESSURE: 78 MMHG | HEIGHT: 62 IN | BODY MASS INDEX: 24.44 KG/M2 | TEMPERATURE: 97 F | OXYGEN SATURATION: 97 % | HEART RATE: 77 BPM | WEIGHT: 132.8 LBS | SYSTOLIC BLOOD PRESSURE: 118 MMHG

## 2021-04-26 DIAGNOSIS — R53.82 CHRONIC FATIGUE: ICD-10-CM

## 2021-04-26 DIAGNOSIS — Z00.00 ENCOUNTER FOR PREVENTATIVE ADULT HEALTH CARE EXAMINATION: Primary | ICD-10-CM

## 2021-04-26 DIAGNOSIS — L65.9 HAIR LOSS: ICD-10-CM

## 2021-04-26 DIAGNOSIS — R00.2 PALPITATIONS: ICD-10-CM

## 2021-04-26 DIAGNOSIS — I10 ESSENTIAL HYPERTENSION: ICD-10-CM

## 2021-04-26 DIAGNOSIS — Z83.49 FAMILY HISTORY OF THYROID DISEASE: ICD-10-CM

## 2021-04-26 LAB
ALBUMIN SERPL-MCNC: 4.6 G/DL (ref 3.4–5)
ALP BLD-CCNC: 48 U/L (ref 40–129)
ALT SERPL-CCNC: 9 U/L (ref 10–40)
ANION GAP SERPL CALCULATED.3IONS-SCNC: 13 MMOL/L (ref 3–16)
ANTI-THYROGLOB ABS: 14 IU/ML
AST SERPL-CCNC: 19 U/L (ref 15–37)
BASOPHILS ABSOLUTE: 0 K/UL (ref 0–0.2)
BASOPHILS RELATIVE PERCENT: 0.7 %
BILIRUB SERPL-MCNC: 0.3 MG/DL (ref 0–1)
BILIRUBIN DIRECT: <0.2 MG/DL (ref 0–0.3)
BILIRUBIN, INDIRECT: ABNORMAL MG/DL (ref 0–1)
BUN BLDV-MCNC: 15 MG/DL (ref 7–20)
CALCIUM SERPL-MCNC: 9 MG/DL (ref 8.3–10.6)
CHLORIDE BLD-SCNC: 101 MMOL/L (ref 99–110)
CHOLESTEROL, TOTAL: 224 MG/DL (ref 0–199)
CO2: 25 MMOL/L (ref 21–32)
CREAT SERPL-MCNC: 0.8 MG/DL (ref 0.6–1.1)
EOSINOPHILS ABSOLUTE: 0.2 K/UL (ref 0–0.6)
EOSINOPHILS RELATIVE PERCENT: 2.8 %
FOLATE: 15.88 NG/ML (ref 4.78–24.2)
GFR AFRICAN AMERICAN: >60
GFR NON-AFRICAN AMERICAN: >60
GLUCOSE BLD-MCNC: 82 MG/DL (ref 70–99)
HCT VFR BLD CALC: 36.9 % (ref 36–48)
HDLC SERPL-MCNC: 65 MG/DL (ref 40–60)
HEMOGLOBIN: 12.4 G/DL (ref 12–16)
LDL CHOLESTEROL CALCULATED: 146 MG/DL
LYMPHOCYTES ABSOLUTE: 1.7 K/UL (ref 1–5.1)
LYMPHOCYTES RELATIVE PERCENT: 28.9 %
MAGNESIUM: 2.4 MG/DL (ref 1.8–2.4)
MCH RBC QN AUTO: 30.7 PG (ref 26–34)
MCHC RBC AUTO-ENTMCNC: 33.7 G/DL (ref 31–36)
MCV RBC AUTO: 91 FL (ref 80–100)
MONOCYTES ABSOLUTE: 0.3 K/UL (ref 0–1.3)
MONOCYTES RELATIVE PERCENT: 5.6 %
NEUTROPHILS ABSOLUTE: 3.7 K/UL (ref 1.7–7.7)
NEUTROPHILS RELATIVE PERCENT: 62 %
PDW BLD-RTO: 13.2 % (ref 12.4–15.4)
PHOSPHORUS: 4.2 MG/DL (ref 2.5–4.9)
PLATELET # BLD: 283 K/UL (ref 135–450)
PMV BLD AUTO: 8.6 FL (ref 5–10.5)
POTASSIUM SERPL-SCNC: 4.9 MMOL/L (ref 3.5–5.1)
RBC # BLD: 4.06 M/UL (ref 4–5.2)
SODIUM BLD-SCNC: 139 MMOL/L (ref 136–145)
T3 FREE: 2.5 PG/ML (ref 2.3–4.2)
T4 FREE: 1.4 NG/DL (ref 0.9–1.8)
THYROID PEROXIDASE (TPO) ABS: 47 IU/ML
TOTAL PROTEIN: 7.8 G/DL (ref 6.4–8.2)
TRIGL SERPL-MCNC: 66 MG/DL (ref 0–150)
TSH SERPL DL<=0.05 MIU/L-ACNC: 3.49 UIU/ML (ref 0.27–4.2)
VITAMIN B-12: 828 PG/ML (ref 211–911)
VITAMIN D 25-HYDROXY: 27.6 NG/ML
VLDLC SERPL CALC-MCNC: 13 MG/DL
WBC # BLD: 6 K/UL (ref 4–11)

## 2021-04-26 PROCEDURE — 99396 PREV VISIT EST AGE 40-64: CPT | Performed by: FAMILY MEDICINE

## 2021-04-26 ASSESSMENT — ENCOUNTER SYMPTOMS
VOMITING: 0
RHINORRHEA: 0
SHORTNESS OF BREATH: 0
COUGH: 0
CONSTIPATION: 0
DIARRHEA: 0
SINUS PRESSURE: 0
ABDOMINAL PAIN: 0
TROUBLE SWALLOWING: 0
BLOOD IN STOOL: 0
BACK PAIN: 0
ABDOMINAL DISTENTION: 0
WHEEZING: 0
NAUSEA: 0
CHEST TIGHTNESS: 0

## 2021-04-26 ASSESSMENT — PATIENT HEALTH QUESTIONNAIRE - PHQ9
SUM OF ALL RESPONSES TO PHQ9 QUESTIONS 1 & 2: 0
1. LITTLE INTEREST OR PLEASURE IN DOING THINGS: 0

## 2021-04-27 LAB
ESTIMATED AVERAGE GLUCOSE: 99.7 MG/DL
HBA1C MFR BLD: 5.1 %

## 2021-04-29 PROBLEM — E78.2 MIXED HYPERLIPIDEMIA: Status: ACTIVE | Noted: 2021-04-29

## 2021-04-29 PROBLEM — E55.9 VITAMIN D INSUFFICIENCY: Status: ACTIVE | Noted: 2021-04-29

## 2021-04-29 PROBLEM — R76.8 ANTI-TPO ANTIBODIES PRESENT: Status: ACTIVE | Noted: 2021-04-29

## 2021-04-29 LAB — TSH RECEPTOR AB: <0.9 IU/L

## 2021-09-30 ENCOUNTER — PATIENT MESSAGE (OUTPATIENT)
Dept: FAMILY MEDICINE CLINIC | Age: 56
End: 2021-09-30

## 2021-09-30 NOTE — TELEPHONE ENCOUNTER
From: Norbert Mohr  To: Shaista Vizcaino  Sent: 9/30/2021 1:11 PM EDT  Subject: Non-Urgent Medical Question    Hi Dr. Steffanie Gilliam,   I currently have a medical exemption from the annual flu vaccine since 2011. I had an allergic reaction with my first flu shot in 2010 and have since been approved by [address] [place] [address]) with a medical exemption. With this known, I would like to also be have a medical exemption from the required Covid flu vaccine. I have attached my original attestation and my previous PCPs note for exemption attached for your review. I have my part of the paper work filled out and can drop off the other part for you.    Sincerely,   Marilee White   [phone]

## 2021-10-01 NOTE — TELEPHONE ENCOUNTER
Please compose a letter stating that patient had a severe reaction to the influenza vaccine and cite her side effects with it and as a result that she be granted a medical exemption. I would also add for her to wear a mask in public settings. Gurinder Rodriguez [phone]

## 2021-10-04 ENCOUNTER — TELEPHONE (OUTPATIENT)
Dept: FAMILY MEDICINE CLINIC | Age: 56
End: 2021-10-04

## 2021-10-04 NOTE — TELEPHONE ENCOUNTER
Called pt to advise that her Covid-19 vaccine medical exemption form was completed. Form is scanned into media. Please advise pt that form is up front for her to . LM for her to call the office.

## 2021-11-03 ENCOUNTER — PATIENT MESSAGE (OUTPATIENT)
Dept: FAMILY MEDICINE CLINIC | Age: 56
End: 2021-11-03

## 2021-11-03 DIAGNOSIS — Z78.9 LDL-C GREATER THAN OR EQUAL TO 100 MG/DL: ICD-10-CM

## 2021-11-03 DIAGNOSIS — E55.9 VITAMIN D DEFICIENCY: Primary | ICD-10-CM

## 2021-11-03 NOTE — TELEPHONE ENCOUNTER
From: Micky Lee  To: Sunitha Price  Sent: 11/3/2021 11:14 AM EDT  Subject: Non-Urgent Medical Question    Hi Dr. Willoughby Degree,   Can I have a lab order for follow up blood work for my cholesterol and vitamin D.    Thanks

## 2021-11-19 DIAGNOSIS — E55.9 VITAMIN D DEFICIENCY: ICD-10-CM

## 2021-11-19 DIAGNOSIS — Z78.9 LDL-C GREATER THAN OR EQUAL TO 100 MG/DL: ICD-10-CM

## 2021-11-19 LAB
CHOLESTEROL, TOTAL: 229 MG/DL (ref 0–199)
HDLC SERPL-MCNC: 71 MG/DL (ref 40–60)
LDL CHOLESTEROL CALCULATED: 138 MG/DL
TRIGL SERPL-MCNC: 100 MG/DL (ref 0–150)
VITAMIN D 25-HYDROXY: 30.7 NG/ML
VLDLC SERPL CALC-MCNC: 20 MG/DL

## 2021-12-03 ENCOUNTER — TELEPHONE (OUTPATIENT)
Dept: FAMILY MEDICINE CLINIC | Age: 56
End: 2021-12-03

## 2021-12-03 ENCOUNTER — E-VISIT (OUTPATIENT)
Dept: FAMILY MEDICINE CLINIC | Age: 56
End: 2021-12-03
Payer: COMMERCIAL

## 2021-12-03 DIAGNOSIS — Z20.822 CLOSE EXPOSURE TO COVID-19 VIRUS: Primary | ICD-10-CM

## 2021-12-03 PROCEDURE — 99423 OL DIG E/M SVC 21+ MIN: CPT | Performed by: INTERNAL MEDICINE

## 2021-12-03 ASSESSMENT — LIFESTYLE VARIABLES: SMOKING_STATUS: NO, I HAVE NEVER SMOKED

## 2021-12-03 NOTE — PROGRESS NOTES
I was unable to find the results of recent Covid testing. You probably have a recent Covid infection, however, if you are have trouble with taste and smell and have a recent known Covid positive contact. Quarantine for at least 10 days since your symptoms started. Contact recent contacts and warn them that you may have Covid. Practice the 6 feet distancing rule and good hygiene. Follow up with your primary care provider. 21 or more minutes were spent on the digital evaluation and management of this patient.

## 2021-12-14 NOTE — PROGRESS NOTES
Ida Barnett   64 y.o. female   1965    Virtual visit encounter type:   [x] Doxy. me  [] Telephone w/o video  [] MyChart  [] Other: This patient was last seen by me on 4/26/2021. HPI:  Chief Complaint   Patient presents with    Follow-up     Covid follow up. No more sx. Patient stated on the day of her birthday, she had increased heart rate (140 bpm) and just felt bad. About 3-4 days later, she had lost of smell and taste. Patient got rx of Ivermectin and took it for past 5 days, she stated that her myalgia resolved after 24 hours later. She had fluctuating symptoms of low grade fever and pleuritic chest discomfort and SOB for past 2 weeks. He contacted a family friend who is a cardiologist and called in prednisone and abx. Patient stated she's nearly 80% better. Patient reported still has fatigue and has had short term memory lapses. She reported of mild PHILLIPS. Otherwise, looks well and eating regular diet. Of note, patient is unvaccinated. She requested that I write a letter of medical exemption due to prior history of significant adverse reaction. Patient ran out of PTO and was told to file leave of absence. She returned back to work Dec 6th. She works from home exclusively for [employer] ([employer]). Allergies   Allergen Reactions    Erythromycin Other (See Comments)     fever    Influenza Vaccines     Sulfamethoxazole-Trimethoprim     Cephalexin Rash    Sulfa Antibiotics Hives and Rash       Current Outpatient Medications on File Prior to Visit   Medication Sig Dispense Refill    diclofenac sodium (VOLTAREN) 1 % GEL Apply 2 g topically every 6 hours as needed for Pain (arthritis) 1 Tube 2     No current facility-administered medications on file prior to visit.         Family History   Problem Relation Age of Onset    No Known Problems Mother     Other Father     Coronary Art Dis Father     No Known Problems Sister     No Known Problems Brother     No Known Problems 0832    K 4.9 04/26/2021 0832     04/26/2021 0832    CO2 25 04/26/2021 0832    BUN 15 04/26/2021 0832    CREATININE 0.8 04/26/2021 0832        Component Value Date/Time    CALCIUM 9.0 04/26/2021 0832    ALKPHOS 48 04/26/2021 0832    AST 19 04/26/2021 0832    ALT 9 (L) 04/26/2021 0832    BILITOT 0.3 04/26/2021 0832          Lab Results   Component Value Date    ALT 9 (L) 04/26/2021    AST 19 04/26/2021    ALKPHOS 48 04/26/2021    BILITOT 0.3 04/26/2021     Lab Results   Component Value Date    LABA1C 5.1 04/26/2021     Lab Results   Component Value Date    EAG 99.7 04/26/2021       Wt Readings from Last 3 Encounters:   04/26/21 132 lb 12.8 oz (60.2 kg)   09/25/20 131 lb (59.4 kg)   06/26/20 125 lb (56.7 kg)     BP Readings from Last 3 Encounters:   04/26/21 118/78   06/26/20 113/78   06/09/20 (!) 151/93     Pulse Readings from Last 3 Encounters:   04/26/21 77   06/26/20 70   06/09/20 116       There were no vitals taken for this visit. Physical Exam  Vitals reviewed. Constitutional:       General: She is awake. She is not in acute distress. Appearance: Normal appearance. She is not ill-appearing. HENT:      Head: Normocephalic and atraumatic. Right Ear: External ear normal.      Left Ear: External ear normal.      Nose: Nose normal.   Eyes:      General: No scleral icterus. Right eye: No discharge. Left eye: No discharge. Extraocular Movements: Extraocular movements intact. Conjunctiva/sclera: Conjunctivae normal.   Pulmonary:      Effort: Pulmonary effort is normal. No respiratory distress. Breath sounds: No stridor. No wheezing. Abdominal:      General: There is no distension. Musculoskeletal:         General: Normal range of motion. Cervical back: Normal range of motion. No erythema. Skin:     Coloration: Skin is not cyanotic, jaundiced or pale. Findings: No erythema. Neurological:      General: No focal deficit present.       Mental Status: She is alert and oriented to person, place, and time. Mental status is at baseline. Psychiatric:         Attention and Perception: Attention and perception normal.         Mood and Affect: Mood and affect normal.         Speech: Speech normal.         Behavior: Behavior normal. Behavior is cooperative. Thought Content: Thought content normal.        Assessment/Plan:   Ying Junior was seen today for follow-up. Diagnoses and all orders for this visit:    COVID-19 long hakeerthi manifesting chronic fatigue  Comments:  Provide patient reassurance that she will gradually get better. Will order COVID-19 antibody test as requested to show evidence that she contracted COVID-19 infection. Patient only has had an at home test done. No PCR test was done afterwards for confirmation. We discussed that given that she's unvaccinated and her symptoms started 3 weeks ago that her antibody test (qualitiative not quantitative) should come back positive. Orders:  -     Covid-19, Antibody, Total; Future    Disturbances of sensation of smell and taste  Comments:  Provided patient reassurance. Advised to do stimulate her senses with smelling and tasting various things. Encounter for completion of form with patient  Comments: Will complete accordingly. I reviewed the plan of care with the patient. Patient acknowledged understanding and agreed with plan of care overall. There are no discontinued medications. General information on medications:  -When it comes to medications, whether with starting or adding a new medication or increasing the dose of a current medication, the benefits and risks have to always be considered and weighed over, especially if one is taking other medications as well.    -There are no medications that have no side effects and that there is always a risk involved with taking a medication.    -If a side effect were to occur with starting a new medication or with increasing the dose of a current medication that either the medication can be totally discontinued altogether or simply decrease the dose of it and if this would be the case a follow-up appointment would be deemed necessary.    -The drug allergy list will then be updated with the corresponding side effect(s) if it's deemed to be a true 'drug allergy'. -The most common adverse effects of medication(s) were addressed at today's visit.    -Lastly, the coverage status of a medication may vary from insurance to insurance and the only way to verify if the medication is covered is to send an actual prescription in.    -The drug formulary of each insurance changes without any warning or notification to the healthcare provider let alone the pharmacy.  -The cost of medications vary from insurance to insurance and the cost is always subject to change just like the drug formulary of each insurance. Follow-up: Return if symptoms worsen or fail to improve. .     Patient was informed that if his or her symptoms worsen to follow up with me sooner or go to the nearest ER if the symptoms are very significant and warrant higher level of care. Regarding my note:  -This note was composed (by me only and not with assistance via a scribe) to the best of my knowledge and recollection of the encounter with the patient using one of my own customized note templates utilizing a combination of typing and dictating with the [address] [place] [address] speech recognition software. As a result, the note may possibly have various errors (e.g. spelling, grammar, and non-sensible words/phrases/statements) despite reviewing the note prior to signing it for completion. General disclaimer regarding telemedicine (virtual) visits:  Conrad Burt is a 64 y.o. female is being evaluated by a virtual visit (video visit) and/or telephone (without video) encounter to address their concern(s) as mentioned above.   Prior to arranging this appointment, the patient was made aware of the need and importance to schedule the visit in this manner given the current ongoing COVID-19 pandemic. The patient was also made aware that the telemedicine service used (e.g.doxy. me) would not save let alone record any information (audio, video, and text) regarding the actual virtual visit. After this discussion, the patient acknowledged understanding and agreed to today's virtual visit encounter. A caregiver was present when appropriate as well as an  if requested. Due to this being a TeleHealth encounter (during the HBGUV-75 public health emergency), evaluation of the following organ systems was overall limited: Vitals/Constitutional/EENT/Resp/CV/GI//MS/Neuro/Skin/Heme-Lymph-Imm. As a result, establishing a diagnosis(s) and formulating a plan of care may be overall more difficult and complicated compared to a conventional (face-to-face) office visit. The patient was made aware about the potential limitations and difficulties of a telemedicine visit such as having technical difficulties related to video and/or audio issues often stemming from a sub-optimal/poor Internet or cellular data connection and/or other factors. If this is judged to be the case then the patient would be informed if deemed relevant and necessary that an in-person follow-up visit may be recommended. Pursuant to the emergency declaration under the [facility] [address] authority and the [employer] Act, this virtual visit was conducted with the patient's (and/or legal guardian's) consent, to reduce the patient's risk (as well as others) of exposure to COVID-19 and to continue to maintain and provide necessary medical care.   The patient (and/or legal guardian) has also been advised to contact this office for worsening conditions or problems, and seek emergency medical treatment and/or call 911 if deemed necessary. Gurinder Pruitt M.D.   [address] [place] [address]    Electronically signed by Helder Acevedo M.D. on 12/16/2021 at 1:42 PM.

## 2021-12-15 ENCOUNTER — VIRTUAL VISIT (OUTPATIENT)
Dept: FAMILY MEDICINE CLINIC | Age: 56
End: 2021-12-15
Payer: COMMERCIAL

## 2021-12-15 DIAGNOSIS — Z02.89 ENCOUNTER FOR COMPLETION OF FORM WITH PATIENT: ICD-10-CM

## 2021-12-15 DIAGNOSIS — G93.32 COVID-19 LONG HAULER MANIFESTING CHRONIC FATIGUE: Primary | ICD-10-CM

## 2021-12-15 DIAGNOSIS — R43.9 DISTURBANCES OF SENSATION OF SMELL AND TASTE: ICD-10-CM

## 2021-12-15 DIAGNOSIS — U09.9 COVID-19 LONG HAULER MANIFESTING CHRONIC FATIGUE: Primary | ICD-10-CM

## 2021-12-15 PROBLEM — I20.8 MICROVASCULAR ANGINA (HCC): Status: ACTIVE | Noted: 2017-07-06

## 2021-12-15 PROBLEM — U07.1 COVID-19 VIRUS INFECTION: Status: ACTIVE | Noted: 2021-12-05

## 2021-12-15 PROBLEM — I34.0 MILD MITRAL VALVE REGURGITATION: Status: ACTIVE | Noted: 2021-12-05

## 2021-12-15 PROBLEM — I20.89 MICROVASCULAR ANGINA: Status: ACTIVE | Noted: 2017-07-06

## 2021-12-15 PROCEDURE — 99212 OFFICE O/P EST SF 10 MIN: CPT | Performed by: FAMILY MEDICINE

## 2021-12-15 RX ORDER — AMOXICILLIN AND CLAVULANATE POTASSIUM 875; 125 MG/1; MG/1
1 TABLET, FILM COATED ORAL EVERY 12 HOURS
COMMUNITY
Start: 2021-12-05 | End: 2021-12-15

## 2021-12-15 RX ORDER — PREDNISONE 10 MG/1
20 TABLET ORAL DAILY
COMMUNITY
Start: 2021-12-05 | End: 2021-12-15

## 2021-12-15 ASSESSMENT — ENCOUNTER SYMPTOMS
CONSTIPATION: 0
BLOOD IN STOOL: 0
DIARRHEA: 0
COUGH: 0
SHORTNESS OF BREATH: 1
SINUS PRESSURE: 0
RHINORRHEA: 0
WHEEZING: 0
TROUBLE SWALLOWING: 0
ABDOMINAL PAIN: 0
BACK PAIN: 0
CHEST TIGHTNESS: 0
NAUSEA: 0
VOMITING: 0
ABDOMINAL DISTENTION: 0

## 2021-12-16 ENCOUNTER — PATIENT MESSAGE (OUTPATIENT)
Dept: FAMILY MEDICINE CLINIC | Age: 56
End: 2021-12-16

## 2021-12-16 NOTE — TELEPHONE ENCOUNTER
From: Hortencia Prieto  To: Dr. Aguero Ridin2021 9:36 AM EST  Subject: Not feeling well    Hi Dr. Ashley Gillette, last night I did a lot of Mikal stuff and didnt go to bed till late. I couldnt fall asleep till 2 AM and only slept a few hours. I woke up not feeling well and then I got chilled in my heart was racing. In the 140s. Its down now its 90s but I feel like everything started over again. Can Covid come back it seems like I relapsed. Just want to make sure I am ok.

## 2021-12-17 DIAGNOSIS — U09.9 COVID-19 LONG HAULER MANIFESTING CHRONIC FATIGUE: ICD-10-CM

## 2021-12-17 DIAGNOSIS — G93.32 COVID-19 LONG HAULER MANIFESTING CHRONIC FATIGUE: ICD-10-CM

## 2021-12-18 LAB — SARS-COV-2 ANTIBODY, TOTAL: POSITIVE

## 2022-06-08 NOTE — PROGRESS NOTES
Radha Tellez   64 y.o. female   1965    HPI:    Patient was last seen by me on 12/15/2021. Reason(s) for visit:   Chief Complaint   Patient presents with   Levora Kehr she may need blood work     Patient stated that she has flare-ups of \"flu-like symptoms\" (+/- fever, chills, myalgia/arthralgia, fatigue, head congestion, malaise), had herself tested for COVID-19 5 times (via rapid) test and it was negative. Triggers - stress and lack of sleep. These episodes started 2 years ago. These episodes typically last for 5 days. She has also noticed issues with fatigue and hair loss (less thicker and all over the floor). She stated that she has had hand stiffness and joint swelling for over past 10 years. Patient has history of Raynaud's. Never been on CCB. She's interested in autoimmune testing. No FMH of SLE, RA. She has [address] of Hashimoto's. Issues haven't worsened since she had COVID-19. No hx of malar rash. U/S thyroid in June 2020 was normal.    Personal history of COVID-19:  -Tested positive (rapid test) on 11/24/2022. -Was prescribed Ivermectin. Never had to go to ER let alone be hospitalized. -Requested COVID-19 antibody test  - 12/17/2021 - positive  -Vaccination status: unvaccinated . Other:  -Son (23 yo) had COVID-19 a few weeks ago. Patient was unaffected as noted above. Allergies   Allergen Reactions    Erythromycin Other (See Comments)     fever    Influenza Vaccines     Sulfamethoxazole-Trimethoprim     Cephalexin Rash    Sulfa Antibiotics Hives and Rash       No current outpatient medications on file prior to visit. No current facility-administered medications on file prior to visit.         Family History   Problem Relation Age of Onset    No Known Problems Mother     Other Father     Coronary Art Dis Father     No Known Problems Sister     No Known Problems Brother     No Known Problems Maternal Aunt     No Known Problems Maternal Uncle     No Known Problems Paternal Aunt     No Known Problems Paternal Uncle     No Known Problems Maternal Grandmother     No Known Problems Maternal Grandfather     No Known Problems Paternal Grandmother     No Known Problems Paternal Grandfather     No Known Problems Other     Hashimoto Thyroiditis Niece 29    Rheum Arthritis Neg Hx     Osteoarthritis Neg Hx     Asthma Neg Hx     Breast Cancer Neg Hx     Cancer Neg Hx     Diabetes Neg Hx     Heart Failure Neg Hx     High Cholesterol Neg Hx     Hypertension Neg Hx     Migraines Neg Hx     Ovarian Cancer Neg Hx     Rashes/Skin Problems Neg Hx     Seizures Neg Hx     Stroke Neg Hx     Thyroid Disease Neg Hx        Social History     Tobacco Use    Smoking status: Never Smoker    Smokeless tobacco: Never Used   Substance Use Topics    Alcohol use: No        Lab Results   Component Value Date    WBC 6.0 04/26/2021    HGB 12.4 04/26/2021    HCT 36.9 04/26/2021    MCV 91.0 04/26/2021     04/26/2021         Chemistry        Component Value Date/Time     04/26/2021 0832    K 4.9 04/26/2021 0832     04/26/2021 0832    CO2 25 04/26/2021 0832    BUN 15 04/26/2021 0832    CREATININE 0.8 04/26/2021 0832        Component Value Date/Time    CALCIUM 9.0 04/26/2021 0832    ALKPHOS 48 04/26/2021 0832    AST 19 04/26/2021 0832    ALT 9 (L) 04/26/2021 0832    BILITOT 0.3 04/26/2021 0832          Lab Results   Component Value Date    ALT 9 (L) 04/26/2021    AST 19 04/26/2021    ALKPHOS 48 04/26/2021    BILITOT 0.3 04/26/2021       Review of Systems   Constitutional: Positive for activity change and fatigue. Negative for appetite change, fever and unexpected weight change. HENT: Negative for congestion, rhinorrhea, sinus pressure and trouble swallowing. Respiratory: Negative for cough, chest tightness, shortness of breath and wheezing. Cardiovascular: Negative for chest pain, palpitations and leg swelling.    Gastrointestinal: Negative for Abdomen is soft. Musculoskeletal:         General: No deformity. Normal range of motion. Cervical back: Normal range of motion. No erythema. Right lower leg: No edema. Left lower leg: No edema. Skin:     Coloration: Skin is not cyanotic, jaundiced or pale. Findings: No abrasion, abscess, bruising, ecchymosis, erythema, signs of injury, laceration, lesion, petechiae, rash or wound. Neurological:      General: No focal deficit present. Mental Status: She is alert. Mental status is at baseline. GCS: GCS eye subscore is 4. GCS verbal subscore is 5. GCS motor subscore is 6. Cranial Nerves: No cranial nerve deficit, dysarthria or facial asymmetry. Motor: No weakness, tremor, atrophy or seizure activity. Coordination: Coordination normal.      Gait: Gait is intact. Psychiatric:         Attention and Perception: Attention and perception normal.         Mood and Affect: Mood and affect normal.         Speech: Speech normal.         Behavior: Behavior normal. Behavior is cooperative. Thought Content: Thought content normal.       Assessment/Plan:   Ferol Spatz was seen today for other. Diagnoses and all orders for this visit:    Generalized arthritis  -     Ferritin; Future  -     Iron and TIBC; Future  -     TSH; Future  -     T4, Free; Future  -     Vitamin B12 & Folate; Future  -     Vitamin D 25 Hydroxy; Future  -     Magnesium; Future  -     CBC with Auto Differential; Future  -     Renal Function Panel; Future  -     Hepatic Function Panel; Future  -     BLAISE Reflex to Antibody Cascade; Future  -     C-Reactive Protein; Future  -     Sedimentation Rate; Future  -     CK; Future  -     Rheumatoid Factor; Future  -     Cyclic Citrul Peptide Antibody, IgG; Future  -     Urinalysis; Future    Chronic fatigue  -     Ferritin; Future  -     Iron and TIBC; Future  -     TSH; Future  -     T4, Free; Future  -     Vitamin B12 & Folate;  Future  -     Vitamin D 25 Hydroxy; Future  -     Magnesium; Future  -     CBC with Auto Differential; Future  -     Renal Function Panel; Future  -     Hepatic Function Panel; Future  -     BLAISE Reflex to Antibody Cascade; Future  -     C-Reactive Protein; Future  -     Sedimentation Rate; Future  -     CK; Future  -     Rheumatoid Factor; Future  -     Cyclic Citrul Peptide Antibody, IgG; Future  -     Urinalysis; Future    Anti-TPO antibodies present  -     TSH; Future  -     T4, Free; Future  -     THYROID PEROXIDASE ANTIBODY; Future  -     T3, Free; Future    Female pattern hair loss  -     TSH; Future  -     T4, Free; Future  -     T3, Free; Future    Family history of thyroid disease    Raynaud's disease without gangrene    Mixed hyperlipidemia  -     Lipid Panel; Future    Personal history of COVID-19      Overview: given patient's various and systemic issues as well as [facility] of autoimmune issues (Hashimoto's) and her own autoimmune issue (Raynaud's) that it would not be unreasonable to do further autoimmune testing. She has no BLAISE, RA, anti-CCP or other autoimmune workup on file. However, she had CRP checked in June 2020 and it was normal.    I reviewed the plan of care with the patient. Patient acknowledged understanding and agreed with plan of care overall. Medications Discontinued During This Encounter   Medication Reason    diclofenac sodium (VOLTAREN) 1 % GEL Therapy completed        General information on medications:  -When it comes to medications, whether with starting or adding a new medication or increasing the dose of a current medication, the benefits and risks have to always be considered and weighed over, especially if one is taking other medications as well.    -There are no medications that have no side effects and that there is always a risk involved with taking a medication.    -If a side effect were to occur with starting a new medication or with increasing the dose of a current medication that either the examination. [x] Ordering of relevant lab work, medications, referrals, or procedures. [x] Discussing patient's medical issues and formulating an assessment and plan. [x] Reviewing plan of care with patient. Answering any questions or concerns. [x] Documentation within the electronic health record (EHR)  [] Reviewing records of history relevant to patient's issues after seeing the patient. [] Discussion or coordination of care with other health care professionals  [x] Other: length office visit - 32 minutes.  -I spent a significant amount of time discussing various issues as noted above and also with formulating a treatment plan for each specific issue. Patient was given the opportunity to ask me any questions and address any concerns/issues. I also reviewed lab work (if available) as well as prior notes from PCP and/or other specialists if available. Gurinder Delgado M.D.   [address] [place] [address]    Electronically signed by Molly Pate MD M.D. on 6/15/2022 at 9:26 PM.

## 2022-06-15 ENCOUNTER — OFFICE VISIT (OUTPATIENT)
Dept: FAMILY MEDICINE CLINIC | Age: 57
End: 2022-06-15
Payer: COMMERCIAL

## 2022-06-15 VITALS
SYSTOLIC BLOOD PRESSURE: 140 MMHG | DIASTOLIC BLOOD PRESSURE: 78 MMHG | WEIGHT: 127 LBS | HEART RATE: 81 BPM | BODY MASS INDEX: 23.37 KG/M2 | OXYGEN SATURATION: 98 % | HEIGHT: 62 IN

## 2022-06-15 DIAGNOSIS — Z83.49 FAMILY HISTORY OF THYROID DISEASE: ICD-10-CM

## 2022-06-15 DIAGNOSIS — E78.2 MIXED HYPERLIPIDEMIA: ICD-10-CM

## 2022-06-15 DIAGNOSIS — I73.00 RAYNAUD'S DISEASE WITHOUT GANGRENE: ICD-10-CM

## 2022-06-15 DIAGNOSIS — Z86.16 PERSONAL HISTORY OF COVID-19: ICD-10-CM

## 2022-06-15 DIAGNOSIS — R53.82 CHRONIC FATIGUE: ICD-10-CM

## 2022-06-15 DIAGNOSIS — L65.8 FEMALE PATTERN HAIR LOSS: ICD-10-CM

## 2022-06-15 DIAGNOSIS — M19.90 GENERALIZED ARTHRITIS: Primary | ICD-10-CM

## 2022-06-15 DIAGNOSIS — R76.8 ANTI-TPO ANTIBODIES PRESENT: ICD-10-CM

## 2022-06-15 PROCEDURE — 99214 OFFICE O/P EST MOD 30 MIN: CPT | Performed by: FAMILY MEDICINE

## 2022-06-15 SDOH — ECONOMIC STABILITY: FOOD INSECURITY: WITHIN THE PAST 12 MONTHS, YOU WORRIED THAT YOUR FOOD WOULD RUN OUT BEFORE YOU GOT MONEY TO BUY MORE.: NEVER TRUE

## 2022-06-15 SDOH — ECONOMIC STABILITY: FOOD INSECURITY: WITHIN THE PAST 12 MONTHS, THE FOOD YOU BOUGHT JUST DIDN'T LAST AND YOU DIDN'T HAVE MONEY TO GET MORE.: NEVER TRUE

## 2022-06-15 ASSESSMENT — ENCOUNTER SYMPTOMS
RHINORRHEA: 0
VOMITING: 0
WHEEZING: 0
ABDOMINAL PAIN: 0
ABDOMINAL DISTENTION: 0
CHEST TIGHTNESS: 0
TROUBLE SWALLOWING: 0
BLOOD IN STOOL: 0
DIARRHEA: 0
NAUSEA: 0
CONSTIPATION: 0
BACK PAIN: 0
SHORTNESS OF BREATH: 0
SINUS PRESSURE: 0
COUGH: 0

## 2022-06-15 ASSESSMENT — PATIENT HEALTH QUESTIONNAIRE - PHQ9
1. LITTLE INTEREST OR PLEASURE IN DOING THINGS: 0
SUM OF ALL RESPONSES TO PHQ9 QUESTIONS 1 & 2: 0
SUM OF ALL RESPONSES TO PHQ QUESTIONS 1-9: 0
2. FEELING DOWN, DEPRESSED OR HOPELESS: 0
SUM OF ALL RESPONSES TO PHQ QUESTIONS 1-9: 0

## 2022-06-15 ASSESSMENT — SOCIAL DETERMINANTS OF HEALTH (SDOH): HOW HARD IS IT FOR YOU TO PAY FOR THE VERY BASICS LIKE FOOD, HOUSING, MEDICAL CARE, AND HEATING?: NOT HARD AT ALL

## 2022-06-21 ENCOUNTER — TELEPHONE (OUTPATIENT)
Dept: OTHER | Facility: CLINIC | Age: 57
End: 2022-06-21

## 2022-06-21 NOTE — TELEPHONE ENCOUNTER
Rosaline Campos, Was contacted today as part of [facility] to schedule a mammogram.         Left VM for pt to return call to this nurse regarding routine health screenings. MyChart message also sent.            Maben BRITTANY, LPN

## 2022-08-12 DIAGNOSIS — R76.8 ANTI-TPO ANTIBODIES PRESENT: ICD-10-CM

## 2022-08-12 DIAGNOSIS — M19.90 GENERALIZED ARTHRITIS: ICD-10-CM

## 2022-08-12 DIAGNOSIS — L65.8 FEMALE PATTERN HAIR LOSS: ICD-10-CM

## 2022-08-12 DIAGNOSIS — E78.2 MIXED HYPERLIPIDEMIA: ICD-10-CM

## 2022-08-12 DIAGNOSIS — R53.82 CHRONIC FATIGUE: ICD-10-CM

## 2022-08-12 LAB
ALBUMIN SERPL-MCNC: 4.3 G/DL (ref 3.4–5)
ALP BLD-CCNC: 63 U/L (ref 40–129)
ALT SERPL-CCNC: 12 U/L (ref 10–40)
ANION GAP SERPL CALCULATED.3IONS-SCNC: 14 MMOL/L (ref 3–16)
AST SERPL-CCNC: 15 U/L (ref 15–37)
BASOPHILS ABSOLUTE: 0 K/UL (ref 0–0.2)
BASOPHILS RELATIVE PERCENT: 0.5 %
BILIRUB SERPL-MCNC: 0.3 MG/DL (ref 0–1)
BILIRUBIN DIRECT: <0.2 MG/DL (ref 0–0.3)
BILIRUBIN, INDIRECT: NORMAL MG/DL (ref 0–1)
BUN BLDV-MCNC: 12 MG/DL (ref 7–20)
C-REACTIVE PROTEIN: <3 MG/L (ref 0–5.1)
CALCIUM SERPL-MCNC: 9.6 MG/DL (ref 8.3–10.6)
CHLORIDE BLD-SCNC: 102 MMOL/L (ref 99–110)
CHOLESTEROL, TOTAL: 215 MG/DL (ref 0–199)
CO2: 25 MMOL/L (ref 21–32)
CREAT SERPL-MCNC: 0.7 MG/DL (ref 0.6–1.1)
EOSINOPHILS ABSOLUTE: 0.1 K/UL (ref 0–0.6)
EOSINOPHILS RELATIVE PERCENT: 2.3 %
FERRITIN: 35.5 NG/ML (ref 15–150)
FOLATE: 12.97 NG/ML (ref 4.78–24.2)
GFR AFRICAN AMERICAN: >60
GFR NON-AFRICAN AMERICAN: >60
GLUCOSE BLD-MCNC: 87 MG/DL (ref 70–99)
HCT VFR BLD CALC: 36.6 % (ref 36–48)
HDLC SERPL-MCNC: 68 MG/DL (ref 40–60)
HEMOGLOBIN: 12.5 G/DL (ref 12–16)
IRON SATURATION: 34 % (ref 15–50)
IRON: 98 UG/DL (ref 37–145)
LDL CHOLESTEROL CALCULATED: 126 MG/DL
LYMPHOCYTES ABSOLUTE: 1.2 K/UL (ref 1–5.1)
LYMPHOCYTES RELATIVE PERCENT: 25.7 %
MAGNESIUM: 2.3 MG/DL (ref 1.8–2.4)
MCH RBC QN AUTO: 30.9 PG (ref 26–34)
MCHC RBC AUTO-ENTMCNC: 34.1 G/DL (ref 31–36)
MCV RBC AUTO: 90.8 FL (ref 80–100)
MONOCYTES ABSOLUTE: 0.3 K/UL (ref 0–1.3)
MONOCYTES RELATIVE PERCENT: 6.2 %
NEUTROPHILS ABSOLUTE: 3.1 K/UL (ref 1.7–7.7)
NEUTROPHILS RELATIVE PERCENT: 65.3 %
PDW BLD-RTO: 13.3 % (ref 12.4–15.4)
PHOSPHORUS: 4.2 MG/DL (ref 2.5–4.9)
PLATELET # BLD: 270 K/UL (ref 135–450)
PMV BLD AUTO: 8.5 FL (ref 5–10.5)
POTASSIUM SERPL-SCNC: 4.2 MMOL/L (ref 3.5–5.1)
RBC # BLD: 4.03 M/UL (ref 4–5.2)
RHEUMATOID FACTOR: <10 IU/ML
SEDIMENTATION RATE, ERYTHROCYTE: 18 MM/HR (ref 0–30)
SODIUM BLD-SCNC: 141 MMOL/L (ref 136–145)
T3 FREE: 2.7 PG/ML (ref 2.3–4.2)
T4 FREE: 1.2 NG/DL (ref 0.9–1.8)
THYROID PEROXIDASE (TPO) ABS: 116 IU/ML
TOTAL CK: 65 U/L (ref 26–192)
TOTAL IRON BINDING CAPACITY: 291 UG/DL (ref 260–445)
TOTAL PROTEIN: 7.3 G/DL (ref 6.4–8.2)
TRIGL SERPL-MCNC: 103 MG/DL (ref 0–150)
TSH SERPL DL<=0.05 MIU/L-ACNC: 3.11 UIU/ML (ref 0.27–4.2)
VITAMIN B-12: 638 PG/ML (ref 211–911)
VITAMIN D 25-HYDROXY: 34.8 NG/ML
VLDLC SERPL CALC-MCNC: 21 MG/DL
WBC # BLD: 4.7 K/UL (ref 4–11)

## 2022-08-13 LAB
ANTI-NUCLEAR ANTIBODY (ANA): NEGATIVE
CYCLIC CITRULLINATED PEPTIDE ANTIBODY IGG: <0.5 U/ML (ref 0–2.9)

## 2022-12-19 ENCOUNTER — TELEMEDICINE (OUTPATIENT)
Dept: FAMILY MEDICINE CLINIC | Age: 57
End: 2022-12-19
Payer: COMMERCIAL

## 2022-12-19 DIAGNOSIS — R50.9 FEBRILE ILLNESS: Primary | ICD-10-CM

## 2022-12-19 DIAGNOSIS — Z71.89 EDUCATED ABOUT 2019 NOVEL CORONAVIRUS INFECTION: ICD-10-CM

## 2022-12-19 DIAGNOSIS — Z20.822 EXPOSURE TO CONFIRMED CASE OF COVID-19: ICD-10-CM

## 2022-12-19 PROCEDURE — 99213 OFFICE O/P EST LOW 20 MIN: CPT | Performed by: FAMILY MEDICINE

## 2022-12-19 RX ORDER — DEXAMETHASONE 2 MG/1
TABLET ORAL
Qty: 5 TABLET | Refills: 0 | Status: SHIPPED | OUTPATIENT
Start: 2022-12-19

## 2022-12-19 RX ORDER — LEVOFLOXACIN 750 MG/1
750 TABLET ORAL DAILY
Qty: 7 TABLET | Refills: 0 | Status: SHIPPED | OUTPATIENT
Start: 2022-12-19 | End: 2022-12-26

## 2022-12-19 ASSESSMENT — ENCOUNTER SYMPTOMS
SHORTNESS OF BREATH: 0
NAUSEA: 0
SORE THROAT: 1
COUGH: 1
RHINORRHEA: 0
ABDOMINAL PAIN: 0
VOMITING: 0
SINUS PRESSURE: 0
CHEST TIGHTNESS: 1
DIARRHEA: 0
WHEEZING: 0

## 2022-12-19 NOTE — PROGRESS NOTES
Miesha Mancera   62 y.o. female   1965    HPI:    Virtual visit encounter type:   [x] Doxy. me  [] Telephone w/o video  [] MyChart  [] Other:     Patient was last seen by me on 6/15/2022. Reason(s) for visit:   Chief Complaint   Patient presents with    Congestion     Chest tightness. Fever    Concern For COVID-19     Positive covid exposure from son who tested positive on 12/17. Negative home covid test this morning. Cough     Dry. Pharyngitis    Generalized Body Aches     Patient was seen today for issues noted below. Symptom duration:   []  day(s)  [x]  2 week(s)   [] None    Symptom course:  [] Worsening       [] Improving    [x] Stable/persistent  [x] Fluctuating (waxing/waning)        [] Asymptomatic     Other historical relevant factors:  [x] Exposure to known sick contacts: 2 sons  [x] Social gatherings: [employer] within past few days  [x] Close contact with a lab confirmed COVID-19 patient within 14 days of symptom onset  [] History of travel from affected geographical areas within 14 days of symptom onset  [] Health care worker exposure w/ no symptoms  [] Health care worker exposure symptomatic    COVID-19 test:  [] Positive       [] PCR test      [] Rapid test  [x] Negative      [] PCR test      [x] Rapid test   [] Not performed    Influenza test:   [] Positive   [] Negative  [x] Not performed    COVID-19 vaccination status:  [] Vaccinated  [x] Unvaccinated    Influenza vaccination status:  [] Vaccinated  [x] Unvaccinated    Immunization History   Administered Date(s) Administered    Td, unspecified formulation 09/01/2006       Symptoms:  Review of Systems   Constitutional:  Positive for activity change, fatigue and fever. Negative for appetite change and chills. HENT:  Positive for sore throat. Negative for congestion, postnasal drip, rhinorrhea and sinus pressure.          Negative for disturbance in taste    negative for disturbance in smell     Respiratory:  Positive for cough and chest tightness. Negative for shortness of breath and wheezing. Cardiovascular:  Positive for palpitations. Gastrointestinal:  Negative for abdominal pain, diarrhea, nausea and vomiting. Musculoskeletal:  Positive for arthralgias and myalgias. Skin:  Negative for rash. Neurological:  Negative for weakness, light-headedness and headaches. Psychiatric/Behavioral:  Negative for decreased concentration and sleep disturbance. [x] Other noteworthy relevant history:      Patient's son came home 2 weeks ago from college and she reported that many students in his dorm tested positive for strep and flu. Patient had flu-like symptoms and was very sick for some time. He did not seek testing. Patient reported one week prior to her office visit, she feeling tired all week. She has had issues of intermittent sore throat and myalgia of BLE. Her ther son came home from college 3 days ago and was very sick. Her son went to [facility] and tested positive for COVID-19 there. Patient has prior history of COVID-19 in 2021. She was not hospitalized and received Ivermectin.     Medications used:  [] NSAID:             [] Ibuprofen (Motrin) - last used:             [] Naproxen (Aleve) - last used:              [] Meloxicam (Mobic)             [] Celecoxib (Celebrex)    [] Tylenol - last used:   [] Anti-tussives -  [] Anti-emetics -  [] Nasal spray -  [] Decongestant -   [] Allergy medication -  [] Antibiotic -   [] Inhaler(s) -  [] Corticosteroids -   [x] Other - lvermectin (leftover from 2021 rx)  [] Humidifier   [] Home remedies -    [] None    Medical risk factors:  [] Pregnant or possibly pregnant  [] Age 72 and older  [] Diabetes  [] Heart disease  [] Asthma  [] COPD/Other chronic lung diseases  [] Autoimmune disease  [] Active cancer  [] On chemotherapy/immunosuppressive drugs  [] Currently or recent history of taking oral corticosteroids  [] History of lymphoma/leukemia  [] History of tobacco smoking  [] Current tobacco smoker  [] Obesity  [] Other:    Allergies   Allergen Reactions    Erythromycin Other (See Comments)     fever    Influenza Vaccines     Sulfamethoxazole-Trimethoprim     Cephalexin Rash    Sulfa Antibiotics Hives and Rash       No current outpatient medications on file prior to visit. No current facility-administered medications on file prior to visit.         Family History   Problem Relation Age of Onset    No Known Problems Mother     Other Father     Coronary Art Dis Father     No Known Problems Sister     No Known Problems Brother     No Known Problems Maternal Aunt     No Known Problems Maternal Uncle     No Known Problems Paternal Aunt     No Known Problems Paternal Uncle     No Known Problems Maternal Grandmother     No Known Problems Maternal Grandfather     No Known Problems Paternal Grandmother     No Known Problems Paternal Grandfather     No Known Problems Other     Hashimoto Thyroiditis Niece 29    Rheum Arthritis Neg Hx     Osteoarthritis Neg Hx     Asthma Neg Hx     Breast Cancer Neg Hx     Cancer Neg Hx     Diabetes Neg Hx     Heart Failure Neg Hx     High Cholesterol Neg Hx     Hypertension Neg Hx     Migraines Neg Hx     Ovarian Cancer Neg Hx     Rashes/Skin Problems Neg Hx     Seizures Neg Hx     Stroke Neg Hx     Thyroid Disease Neg Hx        Social History     Tobacco Use    Smoking status: Never    Smokeless tobacco: Never   Substance Use Topics    Alcohol use: No        Lab Results   Component Value Date    WBC 4.7 08/12/2022    HGB 12.5 08/12/2022    HCT 36.6 08/12/2022    MCV 90.8 08/12/2022     08/12/2022       Chemistry        Component Value Date/Time     08/12/2022 0941    K 4.2 08/12/2022 0941     08/12/2022 0941    CO2 25 08/12/2022 0941    BUN 12 08/12/2022 0941    CREATININE 0.7 08/12/2022 0941        Component Value Date/Time    CALCIUM 9.6 08/12/2022 0941    ALKPHOS 63 08/12/2022 0941    AST 15 08/12/2022 0941    ALT 12 08/12/2022 0941    BILITOT 0.3 08/12/2022 0941          Lab Results   Component Value Date    ALT 12 08/12/2022    AST 15 08/12/2022    ALKPHOS 63 08/12/2022    BILITOT 0.3 08/12/2022     Lab Results   Component Value Date    LABA1C 5.1 04/26/2021     Lab Results   Component Value Date    EAG 99.7 04/26/2021       Review of systems:  As noted above. Otherwise, rest of ROS is negative. Wt Readings from Last 3 Encounters:   06/15/22 127 lb (57.6 kg)   04/26/21 132 lb 12.8 oz (60.2 kg)   09/25/20 131 lb (59.4 kg)       BP Readings from Last 3 Encounters:   06/15/22 (!) 140/78   04/26/21 118/78   06/26/20 113/78       Pulse Readings from Last 3 Encounters:   06/15/22 81   04/26/21 77   06/26/20 70       There were no vitals taken for this visit. Physical Exam  Vitals reviewed. Constitutional:       General: She is awake. She is not in acute distress. Appearance: Normal appearance. She is not ill-appearing. HENT:      Head: Normocephalic and atraumatic. Right Ear: External ear normal.      Left Ear: External ear normal.      Nose: Nose normal.   Eyes:      General: No scleral icterus. Right eye: No discharge. Left eye: No discharge. Extraocular Movements: Extraocular movements intact. Conjunctiva/sclera: Conjunctivae normal.   Pulmonary:      Effort: Pulmonary effort is normal. No respiratory distress. Breath sounds: No stridor. No wheezing. Abdominal:      General: There is no distension. Musculoskeletal:         General: Normal range of motion. Cervical back: Normal range of motion. No erythema. Skin:     Coloration: Skin is not cyanotic, jaundiced or pale. Findings: No erythema. Neurological:      General: No focal deficit present. Mental Status: She is alert and oriented to person, place, and time. Mental status is at baseline.    Psychiatric:         Attention and Perception: Attention and perception normal.         Mood and Affect: Mood and affect normal.         Speech: Speech normal.         Behavior: Behavior normal. Behavior is cooperative. Thought Content: Thought content normal.        Assessment/Plan:   Anel Armando was seen today for congestion, fever, concern for covid-19, cough, pharyngitis and generalized body aches. Diagnoses and all orders for this visit:    Febrile illness  Comments:  Patient specifically requested rx for Ivermectin. I informed patient that I've never prescribed Ivermectin before and have therefore no working knowledge on how to prescribe it. Ivermectin is known as an anti-parasitic medication. Given that the patient has had fluctuating symptoms with no resolution, I will place on her antibiotic therapy. Given that she has been exposed to her son who tested positive for COVID-19 (via an at home antigen test), I recommended to have a PCR test done. Orders:  -     levoFLOXacin (LEVAQUIN) 750 MG tablet; Take 1 tablet by mouth daily for 7 days  -     dexamethasone (DECADRON) 2 MG tablet; Take 5 tablets PO x1  -     COVID-19; Future    Exposure to confirmed case of COVID-19    Educated about 2019 novel coronavirus infection      Discussion about:  [x] Supportive care - hydration with plenty of water and/or Gatorade/Powerade or nutritional shakes (e.g. Ensure, Boost, etc.)  [x] Monitor temperature and vital signs. [] Informed COVID-19 is a viral infection. Antibiotics are not recommended in the treatment of viral infections (routinely). [] Monoclonal antibody - patient vocalized understanding that this therapy was approved by FDA under [place] and gave permission for approval to be scheduled an appointment for an infusion. [] Influenza medications (Tamiflu or Jearlean Amend) - only effective within 48 hours of symptom onset.   [] COVID-19 medications (Paxlovid or Lagevrio) - only effective within 5 days of symptom onset and is meant for mild to moderate COVID-19 infection (outpatients with high risk of progression to severe illness). [] Famotidine - some studies have shown Famotidine 80 mg TID x 14 days leads to earlier resolution of symptoms. [x] The time that it takes for each person to recover from infection differs and is largely dependent on many factors such as chronic health conditions, being immunocompetent (or not), taking the medications (if prescribed) as directed, age, etc.    Recommendations:  [] Advised patient to notify anyone that they have been in close contact with about their symptoms. [] Recommended quarantining at least 5 days (per current CDC guidelines) from either day of exposure or from onset of symptoms. [x] Informed patient that having a COVID-19 test done prior to day 5 (of either exposure or symptom onset) could lead to a false negative result and that testing would be recommended (preferably with a PCR test) on day 5 or later. [] Recommended obtaining a pulse oximeter (if unavailable) to measure oxygen saturation. [] Recommended using an incentive spirometer and deep breathing exercises to help prevent atelectasis. [] Patient was offered as needed medications (inhaler, anti-tussive, anti-emetic, etc.) but declined. Other:  [] Notification to work/school letter provided to patient. I reviewed the plan of care with the patient. Patient acknowledged understanding and agreed with plan of care overall. Medications Discontinued During This Encounter   Medication Reason    Pseudoephedrine HCl (SUDAFED PO) LIST CLEANUP        General information on medications:  -When it comes to medications, whether with starting or adding a new medication or increasing the dose of a current medication, the benefits and risks have to always be considered and weighed over, especially if one is taking other medications as well.    -There are no medications that have no side effects and that there is always a risk involved with taking a medication.    -If a side effect were to occur with starting a new medication or with increasing the dose of a current medication that either the medication can be totally discontinued altogether or simply decrease the dose of it and if this would be the case a follow-up appointment would be deemed necessary.    -The drug allergy list will then be updated with the corresponding side effect(s) if it's deemed to be a true 'drug allergy'. -The most common adverse effects of medication(s) were addressed at today's visit.    -Lastly, the coverage status of a medication may vary from insurance to insurance and the only way to verify if the medication is covered is to send an actual prescription in.    -The drug formulary of each insurance changes without any warning or notification to the healthcare provider let alone the pharmacy.  -The cost of medications vary from insurance to insurance and the cost is always subject to change just like the drug formulary. General disclaimer regarding telemedicine (virtual) visits:  Yanci Medrano is a 62 y.o. female is being evaluated by a virtual visit (video visit) and/or telephone (without video) encounter to address their concern(s) as mentioned above. Prior to arranging this appointment, the patient was made aware of the need and importance to schedule the visit in this manner given the current ongoing COVID-19 pandemic. The patient was also made aware that the telemedicine service used (e.g.doxy. me) would not save let alone record any information (audio, video, and text) regarding the actual virtual visit. After this discussion, the patient acknowledged understanding and agreed to today's virtual visit encounter. A caregiver was present when appropriate as well as an  if requested. The patient is aware that telemedicine visits are a billable service just like an in person visit is. The patient was located in a state where the healthcare provider was licensed to provide care.     Due to this being a TeleHealth encounter (during the JGMSI-03 public health emergency), evaluation of the following organ systems was overall limited: Vitals/Constitutional/EENT/Resp/CV/GI//MS/Neuro/Skin/Heme-Lymph-Imm. As a result, establishing a diagnosis(s) and formulating a plan of care may be overall more difficult and complicated compared to a conventional (face-to-face) office visit. The patient was made aware about the potential limitations and difficulties of a telemedicine visit such as having technical difficulties related to video and/or audio issues often stemming from a sub-optimal/poor Internet or cellular data connection and/or other factors. If this is judged to be the case then the patient would be informed if deemed relevant and necessary that an in-person follow-up visit may be recommended. Pursuant to the emergency declaration under the [facility], [address] [place] [address] authority and the [employer] Act, this virtual visit was conducted with the patient's (and/or legal guardian's) consent, to reduce the patient's risk (as well as others) of exposure to COVID-19 and to continue to maintain and provide necessary medical care. The patient (and/or legal guardian) has also been advised to contact this office for worsening conditions or problems, and seek emergency medical treatment and/or call 911 if deemed necessary. Follow-up: Return if symptoms worsen or fail to improve. .     Patient was informed that if his or her symptoms worsen to follow up with me sooner or go to the nearest ER if the symptoms are very significant and warrant higher level of care.     Regarding my note:  -This note was composed (by me only and not with assistance via a scribe) to the best of my knowledge and recollection of the encounter with the patient using one of my own customized note templates utilizing a combination of typing and dictating with the [address] [place] [address] [address]  [employer] medical speech recognition software. As a result, the note may possibly contain various errors (e.g. spelling, grammar, and non-sensible words/phrases/statements) despite reviewing the note prior to signing it for completion. Time spent includes some or all of the following, both face-to-face time and non face-to-face time, but is not limited to:  [x] Preparing to see the patient by reviewing medical records available (notes, labs, imaging, etc.) prior to seeing the patient. [x] Obtaining and/or reviewing the history from the patient. [x] Performing a medically appropriate examination. [x] Ordering of relevant lab work, medications, referrals, or procedures. [x] Discussing patient's medical issues and formulating an assessment and plan. [x] Reviewing plan of care with patient. Answering any questions or concerns. [x] Documentation within the electronic health record (EHR)  [] Reviewing records of history relevant to patient's issues after seeing the patient. [] Discussion or coordination of care with other health care professionals  [x] Other: length office visit - 22 minutes.  -I spent a significant amount of time discussing various issues as noted above and also with formulating a treatment plan for each specific issue. Patient was given the opportunity to ask me any questions and address any concerns/issues. I also reviewed lab work (if available) as well as prior notes from PCP and/or other specialists if available. Gurinder Aguilar M.D.   [address] [place] [address]    Electronically signed by Lyssa Anthony MD M.D. on 12/20/2022 at 8:00 AM.

## 2023-05-05 LAB
CHOLEST SERPL-MCNC: 206 MG/DL (ref 0–199)
GLUCOSE SERPL-MCNC: 85 MG/DL (ref 70–99)
HDLC SERPL-MCNC: 60 MG/DL (ref 40–60)
LDLC SERPL CALC-MCNC: 130 MG/DL
TRIGL SERPL-MCNC: 82 MG/DL (ref 0–150)

## 2023-07-06 NOTE — PROGRESS NOTES
SUPRACLAVICULAR:  No lymphadenopathy detected. IMPRESSION / Minerva Mcfarland / Yesenia Crocker:       Kamille Madrigal was seen today for pharyngitis. Diagnoses and all orders for this visit:    Acute pharyngitis, unspecified etiology  -     amoxicillin-clavulanate (AUGMENTIN) 875-125 MG per tablet; Take 1 tablet by mouth 2 times daily for 10 days    Otalgia of left ear    Mass of oropharynx  Comments:  probably lymphoid aggregate left tonsillar fossae    Globus pharyngeus         RECOMMENDATIONS/PLAN:    1. Augmentin. 2. Consider excision of left tonsillar fossa lymphoid aggregate. 3. Return in about 2 weeks (around 6/23/2020) for flexible fiberoptic nasal and nasopharyngolaryngoscopy, flexible fiberoptic nasopharyngolaryngoscopy. verbal instruction/written material

## 2023-09-11 ENCOUNTER — OFFICE VISIT (OUTPATIENT)
Dept: GYNECOLOGY | Age: 58
End: 2023-09-11
Payer: COMMERCIAL

## 2023-09-11 VITALS
HEART RATE: 68 BPM | OXYGEN SATURATION: 100 % | BODY MASS INDEX: 24.8 KG/M2 | SYSTOLIC BLOOD PRESSURE: 114 MMHG | WEIGHT: 134.8 LBS | HEIGHT: 62 IN | RESPIRATION RATE: 17 BRPM | DIASTOLIC BLOOD PRESSURE: 74 MMHG

## 2023-09-11 DIAGNOSIS — N84.1 CERVICAL POLYP: ICD-10-CM

## 2023-09-11 DIAGNOSIS — Z78.0 MENOPAUSE: ICD-10-CM

## 2023-09-11 DIAGNOSIS — Z01.419 WELL WOMAN EXAM WITH ROUTINE GYNECOLOGICAL EXAM: Primary | ICD-10-CM

## 2023-09-11 PROCEDURE — 99386 PREV VISIT NEW AGE 40-64: CPT | Performed by: OBSTETRICS & GYNECOLOGY

## 2023-09-11 NOTE — PROGRESS NOTES
Subjective:      Patient ID: Corrine Hargrove is a 62 y.o. female. Patient is here for annual.       Review of Systems   Constitutional: Negative. HENT: Negative. Eyes: Negative. Respiratory: Negative. Cardiovascular: Negative. Gastrointestinal: Negative. Endocrine: Negative. Genitourinary: Negative. Musculoskeletal: Negative. Skin: Negative. Allergic/Immunologic: Negative. Neurological: Negative. Hematological: Negative. Psychiatric/Behavioral: Negative.        Date of Birth 1965  Past Medical History:   Diagnosis Date    Abnormal Pap smear of cervix     Anemia     Anxiety     Dysmenorrhea     Endometriosis     Fibrocystic breast     GERD (gastroesophageal reflux disease)     Hair loss     HSV (herpes simplex virus) anogenital infection     HTN (hypertension)     Menopause ovarian failure     Menorrhagia     Ovarian cyst     STD (sexually transmitted disease)      Past Surgical History:   Procedure Laterality Date     SECTION          COLONOSCOPY N/A 10/05/2018    COLONOSCOPY POLYPECTOMY SNARE/COLD BIOPSY performed by Robson Villalba MD at [address] [place] [address] for Endometrosis    LEEP      PELVIC LAPAROSCOPY      TONSILLECTOMY       OB History    Para Term  AB Living   7 3 3   4 3   SAB IAB Ectopic Molar Multiple Live Births   4         3      # Outcome Date GA Lbr Ben/2nd Weight Sex Delivery Anes PTL Lv   7 Term 07    F CS-LTranv   KUSUM   6 Term 04    M Vag-Spont   KUSUM   5 Term 10/25/01    M Vag-Spont   KUSUM   4 SAB         FD   3 SAB         FD   2 SAB         FD   1 SAB         FD     Social History     Socioeconomic History    Marital status:      Spouse name: Not on file    Number of children: 3    Years of education: Not on file    Highest education level: Not on file   Occupational History    Occupation:      Employer: [employer]   Tobacco Use

## 2023-09-13 ASSESSMENT — ENCOUNTER SYMPTOMS
RESPIRATORY NEGATIVE: 1
ALLERGIC/IMMUNOLOGIC NEGATIVE: 1
GASTROINTESTINAL NEGATIVE: 1
EYES NEGATIVE: 1

## 2023-12-19 PROBLEM — U09.9 COVID-19 LONG HAULER MANIFESTING CHRONIC LOSS OF SMELL AND TASTE: Status: ACTIVE | Noted: 2023-12-19

## 2023-12-19 PROBLEM — R43.8 COVID-19 LONG HAULER MANIFESTING CHRONIC LOSS OF SMELL AND TASTE: Status: ACTIVE | Noted: 2023-12-19

## 2024-02-21 ENCOUNTER — COMMUNITY OUTREACH (OUTPATIENT)
Dept: FAMILY MEDICINE CLINIC | Age: 59
End: 2024-02-21

## (undated) DEVICE — Device: Brand: DISPOSABLE ELECTROSURGICAL SNARE

## (undated) DEVICE — BW-412T DISP COMBO CLEANING BRUSH: Brand: SINGLE USE COMBINATION CLEANING BRUSH

## (undated) DEVICE — TRAP SPEC RETRV CLR PLAS POLYP IN LN SUCT QUIK CTCH

## (undated) DEVICE — PROCEDURE KIT ENDOSCP CUST

## (undated) DEVICE — SOLUTION IV IRRIG WATER 500ML POUR BRL ST 2F7113